# Patient Record
Sex: FEMALE | Race: BLACK OR AFRICAN AMERICAN | NOT HISPANIC OR LATINO | URBAN - METROPOLITAN AREA
[De-identification: names, ages, dates, MRNs, and addresses within clinical notes are randomized per-mention and may not be internally consistent; named-entity substitution may affect disease eponyms.]

---

## 2020-08-13 ENCOUNTER — INPATIENT (INPATIENT)
Facility: HOSPITAL | Age: 45
LOS: 19 days | Discharge: HOME | End: 2020-09-02
Attending: PSYCHIATRY & NEUROLOGY | Admitting: PSYCHIATRY & NEUROLOGY
Payer: MEDICAID

## 2020-08-13 VITALS
HEART RATE: 81 BPM | DIASTOLIC BLOOD PRESSURE: 74 MMHG | RESPIRATION RATE: 16 BRPM | TEMPERATURE: 98 F | SYSTOLIC BLOOD PRESSURE: 100 MMHG | OXYGEN SATURATION: 100 %

## 2020-08-13 DIAGNOSIS — F25.9 SCHIZOAFFECTIVE DISORDER, UNSPECIFIED: ICD-10-CM

## 2020-08-13 DIAGNOSIS — F29 UNSPECIFIED PSYCHOSIS NOT DUE TO A SUBSTANCE OR KNOWN PHYSIOLOGICAL CONDITION: ICD-10-CM

## 2020-08-13 LAB
ALBUMIN SERPL ELPH-MCNC: 4.5 G/DL — SIGNIFICANT CHANGE UP (ref 3.3–5)
ALP SERPL-CCNC: 58 U/L — SIGNIFICANT CHANGE UP (ref 40–120)
ALT FLD-CCNC: 35 U/L — HIGH (ref 4–33)
ANION GAP SERPL CALC-SCNC: 18 MMO/L — HIGH (ref 7–14)
ANISOCYTOSIS BLD QL: SLIGHT — SIGNIFICANT CHANGE UP
APAP SERPL-MCNC: < 15 UG/ML — LOW (ref 15–25)
AST SERPL-CCNC: 50 U/L — HIGH (ref 4–32)
BASOPHILS # BLD AUTO: 0.02 K/UL — SIGNIFICANT CHANGE UP (ref 0–0.2)
BASOPHILS NFR BLD AUTO: 0.5 % — SIGNIFICANT CHANGE UP (ref 0–2)
BASOPHILS NFR SPEC: 0 % — SIGNIFICANT CHANGE UP (ref 0–2)
BILIRUB SERPL-MCNC: < 0.2 MG/DL — LOW (ref 0.2–1.2)
BLASTS # FLD: 0 % — SIGNIFICANT CHANGE UP (ref 0–0)
BUN SERPL-MCNC: 12 MG/DL — SIGNIFICANT CHANGE UP (ref 7–23)
CALCIUM SERPL-MCNC: 9.6 MG/DL — SIGNIFICANT CHANGE UP (ref 8.4–10.5)
CHLORIDE SERPL-SCNC: 102 MMOL/L — SIGNIFICANT CHANGE UP (ref 98–107)
CO2 SERPL-SCNC: 20 MMOL/L — LOW (ref 22–31)
CREAT SERPL-MCNC: 0.79 MG/DL — SIGNIFICANT CHANGE UP (ref 0.5–1.3)
EOSINOPHIL # BLD AUTO: 0.1 K/UL — SIGNIFICANT CHANGE UP (ref 0–0.5)
EOSINOPHIL NFR BLD AUTO: 2.3 % — SIGNIFICANT CHANGE UP (ref 0–6)
EOSINOPHIL NFR FLD: 0.9 % — SIGNIFICANT CHANGE UP (ref 0–6)
ETHANOL BLD-MCNC: < 10 MG/DL — SIGNIFICANT CHANGE UP
GIANT PLATELETS BLD QL SMEAR: PRESENT — SIGNIFICANT CHANGE UP
GLUCOSE SERPL-MCNC: 77 MG/DL — SIGNIFICANT CHANGE UP (ref 70–99)
HCG SERPL-ACNC: < 5 MIU/ML — SIGNIFICANT CHANGE UP
HCT VFR BLD CALC: 45.3 % — HIGH (ref 34.5–45)
HGB BLD-MCNC: 14.5 G/DL — SIGNIFICANT CHANGE UP (ref 11.5–15.5)
IMM GRANULOCYTES NFR BLD AUTO: 0.2 % — SIGNIFICANT CHANGE UP (ref 0–1.5)
LYMPHOCYTES # BLD AUTO: 0.75 K/UL — LOW (ref 1–3.3)
LYMPHOCYTES # BLD AUTO: 17.5 % — SIGNIFICANT CHANGE UP (ref 13–44)
LYMPHOCYTES NFR SPEC AUTO: 9.2 % — LOW (ref 13–44)
MACROCYTES BLD QL: SLIGHT — SIGNIFICANT CHANGE UP
MCHC RBC-ENTMCNC: 32 % — SIGNIFICANT CHANGE UP (ref 32–36)
MCHC RBC-ENTMCNC: 32 PG — SIGNIFICANT CHANGE UP (ref 27–34)
MCV RBC AUTO: 100 FL — SIGNIFICANT CHANGE UP (ref 80–100)
METAMYELOCYTES # FLD: 0 % — SIGNIFICANT CHANGE UP (ref 0–1)
MONOCYTES # BLD AUTO: 0.75 K/UL — SIGNIFICANT CHANGE UP (ref 0–0.9)
MONOCYTES NFR BLD AUTO: 17.5 % — HIGH (ref 2–14)
MONOCYTES NFR BLD: 14.7 % — HIGH (ref 2–9)
MYELOCYTES NFR BLD: 0 % — SIGNIFICANT CHANGE UP (ref 0–0)
NEUTROPHIL AB SER-ACNC: 68.8 % — SIGNIFICANT CHANGE UP (ref 43–77)
NEUTROPHILS # BLD AUTO: 2.66 K/UL — SIGNIFICANT CHANGE UP (ref 1.8–7.4)
NEUTROPHILS NFR BLD AUTO: 62 % — SIGNIFICANT CHANGE UP (ref 43–77)
NEUTS BAND # BLD: 0 % — SIGNIFICANT CHANGE UP (ref 0–6)
NRBC # FLD: 0 K/UL — SIGNIFICANT CHANGE UP (ref 0–0)
OTHER - HEMATOLOGY %: 0 — SIGNIFICANT CHANGE UP
PLATELET # BLD AUTO: 218 K/UL — SIGNIFICANT CHANGE UP (ref 150–400)
PLATELET COUNT - ESTIMATE: NORMAL — SIGNIFICANT CHANGE UP
PMV BLD: 8.9 FL — SIGNIFICANT CHANGE UP (ref 7–13)
POIKILOCYTOSIS BLD QL AUTO: SLIGHT — SIGNIFICANT CHANGE UP
POLYCHROMASIA BLD QL SMEAR: SLIGHT — SIGNIFICANT CHANGE UP
POTASSIUM SERPL-MCNC: 4 MMOL/L — SIGNIFICANT CHANGE UP (ref 3.5–5.3)
POTASSIUM SERPL-SCNC: 4 MMOL/L — SIGNIFICANT CHANGE UP (ref 3.5–5.3)
PROMYELOCYTES # FLD: 0 % — SIGNIFICANT CHANGE UP (ref 0–0)
PROT SERPL-MCNC: 7.5 G/DL — SIGNIFICANT CHANGE UP (ref 6–8.3)
RBC # BLD: 4.53 M/UL — SIGNIFICANT CHANGE UP (ref 3.8–5.2)
RBC # FLD: 12.9 % — SIGNIFICANT CHANGE UP (ref 10.3–14.5)
SALICYLATES SERPL-MCNC: < 5 MG/DL — LOW (ref 15–30)
SARS-COV-2 RNA SPEC QL NAA+PROBE: SIGNIFICANT CHANGE UP
SMUDGE CELLS # BLD: PRESENT — SIGNIFICANT CHANGE UP
SODIUM SERPL-SCNC: 140 MMOL/L — SIGNIFICANT CHANGE UP (ref 135–145)
TSH SERPL-MCNC: 2.93 UIU/ML — SIGNIFICANT CHANGE UP (ref 0.27–4.2)
VARIANT LYMPHS # BLD: 6.4 % — SIGNIFICANT CHANGE UP
WBC # BLD: 4.29 K/UL — SIGNIFICANT CHANGE UP (ref 3.8–10.5)
WBC # FLD AUTO: 4.29 K/UL — SIGNIFICANT CHANGE UP (ref 3.8–10.5)

## 2020-08-13 PROCEDURE — 70450 CT HEAD/BRAIN W/O DYE: CPT | Mod: 26

## 2020-08-13 PROCEDURE — 99285 EMERGENCY DEPT VISIT HI MDM: CPT

## 2020-08-13 RX ORDER — DIPHENHYDRAMINE HCL 50 MG
50 CAPSULE ORAL ONCE
Refills: 0 | Status: COMPLETED | OUTPATIENT
Start: 2020-08-13 | End: 2020-08-13

## 2020-08-13 RX ORDER — HALOPERIDOL DECANOATE 100 MG/ML
5 INJECTION INTRAMUSCULAR ONCE
Refills: 0 | Status: COMPLETED | OUTPATIENT
Start: 2020-08-13 | End: 2020-08-13

## 2020-08-13 RX ORDER — HALOPERIDOL DECANOATE 100 MG/ML
5 INJECTION INTRAMUSCULAR EVERY 6 HOURS
Refills: 0 | Status: DISCONTINUED | OUTPATIENT
Start: 2020-08-13 | End: 2020-09-02

## 2020-08-13 RX ORDER — RISPERIDONE 4 MG/1
2 TABLET ORAL AT BEDTIME
Refills: 0 | Status: DISCONTINUED | OUTPATIENT
Start: 2020-08-13 | End: 2020-08-17

## 2020-08-13 RX ORDER — HALOPERIDOL DECANOATE 100 MG/ML
5 INJECTION INTRAMUSCULAR EVERY 6 HOURS
Refills: 0 | Status: DISCONTINUED | OUTPATIENT
Start: 2020-08-13 | End: 2020-08-19

## 2020-08-13 RX ADMIN — Medication 2 MILLIGRAM(S): at 04:10

## 2020-08-13 RX ADMIN — HALOPERIDOL DECANOATE 5 MILLIGRAM(S): 100 INJECTION INTRAMUSCULAR at 13:09

## 2020-08-13 RX ADMIN — Medication 50 MILLIGRAM(S): at 05:08

## 2020-08-13 RX ADMIN — HALOPERIDOL DECANOATE 5 MILLIGRAM(S): 100 INJECTION INTRAMUSCULAR at 04:10

## 2020-08-13 RX ADMIN — Medication 2 MILLIGRAM(S): at 13:10

## 2020-08-13 NOTE — ED ADULT TRIAGE NOTE - CHIEF COMPLAINT QUOTE
Pt arrives to ED via EMS from street brought in by [facility] and EMS.  Pt was attempting to enter a residence.  Pt states it was her childhood home and she was attempting to deliver eviction papers to people squatting in the residence.  Pt denies all medical or psychiatric history however [facility] reports pt has prior hospitalization related to having to be "tazed" by authorities.   When asked triage questions, pt states no to S/I, H/I, audio or visual hallucinations.  Pt makes statements including "I never had" and lists multiple medical conditions.  Pt denies drugs or ETOH.  Pt states "I am not erratic." Dr hSaikh to evaluate pt. Pt arrives to ED via EMS from street brought in by [facility] and EMS.  Pt was attempting to enter a senior residence.  911 called by  at facility. Pt states it was her childhood home and she was attempting to deliver eviction papers to people squatting in the residence.  Pt denies all medical or psychiatric history however [facility] reports pt has prior hospitalization related to having to be "tazed" by authorities.   When asked triage questions, pt states no to S/I, H/I, audio or visual hallucinations.  Pt makes statements including "I never had" and lists multiple medical conditions.  Pt denies drugs or ETOH.  Pt states "I am not erratic." Dr Shaikh to evaluate pt.

## 2020-08-13 NOTE — ED PROVIDER NOTE - CLINICAL SUMMARY MEDICAL DECISION MAKING FREE TEXT BOX
45 y/o F with no known medical or psychiatric illness BIB EMS and PD for disorganized and bizarre behavior.  Pt is highly disorganized and paranoid on evaluation, agitated and requiring medications and restraints briefly for safety.  Will obtain labs, ekg, tox screen, ct head to eval for organic etiology of behavior.  Exam is atraumatic, poss underlying intoxication, consider psych eval if medical eval is unrevealing.

## 2020-08-13 NOTE — ED ADULT NURSE REASSESSMENT NOTE - NS ED NURSE REASSESS COMMENT FT1
CThead completed, results and psych eval pending. Pt remains sedated, NAD, even unlabored resp observed.

## 2020-08-13 NOTE — ED ADULT NURSE REASSESSMENT NOTE - NS ED NURSE REASSESS COMMENT FT1
+ effect from IM meds observed, pt sedated, labs drawn, pending EKG and covid swab. 4pt restraint and 1:1 obs dc'd as ordered. Pending psych eval and MC. Will continue to monitor for safety.

## 2020-08-13 NOTE — ED BEHAVIORAL HEALTH ASSESSMENT NOTE - VIOLENCE RISK FACTORS:
Impulsivity/Affective dysregulation/Noncompliance with treatment/Lack of insight into violence risk/need for treatment

## 2020-08-13 NOTE — ED ADULT NURSE REASSESSMENT NOTE - NS ED NURSE REASSESS COMMENT FT1
Received report from night RN pt lying on bed in nad eyes close breathing even & unlabored safety & comfort measures maintained eval on going.

## 2020-08-13 NOTE — ED BEHAVIORAL HEALTH ASSESSMENT NOTE - SUICIDE RISK FACTORS
Mood Disorder current/past/Psychotic disorder current/past/Agitation/Severe Anxiety/Panic/Unable to engage in safety planning/Recent onset of current/past psychiatric diagnosis

## 2020-08-13 NOTE — ED ADULT NURSE REASSESSMENT NOTE - NS ED NURSE REASSESS COMMENT FT1
Pt remains agitated, uncooperative and paranoid in  intake area. pt refusing to change into hospital gowns and have belongings secured. Pt medicated as ordered at 0410 with minimal effect observed. Pt receievd STAT benadryl 50mg IM at 0425. Upon removal of handcuffs, patient placed in 4pt restraints at 0430. No injuries observed or reported. Pt cursing at staff and threatening at this time. 1:1 obs maintained for safety. Pending lab draw and psych eval when less agitated.

## 2020-08-13 NOTE — ED ADULT NURSE REASSESSMENT NOTE - NS ED NURSE REASSESS COMMENT FT1
pt lying on bed in nad eyes close breathing even & unlabored report giving to ZH low 4, EMS activated enroute annmarie on going.

## 2020-08-13 NOTE — ED BEHAVIORAL HEALTH NOTE - BEHAVIORAL HEALTH NOTE
Patient remains sedated and unable to participate in interview at this time likely secondary to medication effect as pt received Haldol 5mg/ Ativan 2mg/ Benadryl 50mg IM overnight.  Psychiatry will evaluate when able, call with questions and/or concerns.

## 2020-08-13 NOTE — ED PROVIDER NOTE - OBJECTIVE STATEMENT
45 y/o F with no known past medical or psychiatric hx BIB EMS and PD for disorganized and aggressive behavior.  911 was activated by security at a senior care home.  Pt was found attempting to break into the residence around 3am.  Pt states that this was her childhood home and people are squatting there and that she called PD to assist her in serving the squatters court papers.  Pt denies any issues.  (+)tob use but denies etoh or drug use.      Pt noted to be disorganized and highly irritable on exam, yelling and rambling.  Pt brought back to  immediately on arrival, presented in handcuffs and received IM medications and required 4 point restraints for safety given high level of agitation and disorganization.

## 2020-08-13 NOTE — ED BEHAVIORAL HEALTH ASSESSMENT NOTE - DESCRIPTION
After she came to the ED this morning, the Pt was medicated with Haldol 5mg IM + Ativan 2mg IM + Benadryl 50mg IM with good effect.  Pt slept afterwards.  There was no recurrence of agitation/aggressive behavior prior to the bedside interview that this writer conducted.    Vital Signs Last 24 Hrs  T(C): 36.7 (13 Aug 2020 12:17), Max: 36.7 (13 Aug 2020 12:17)  T(F): 98.1 (13 Aug 2020 12:17), Max: 98.1 (13 Aug 2020 12:17)  HR: 74 (13 Aug 2020 12:17) (74 - 81)  BP: 105/67 (13 Aug 2020 12:17) (100/74 - 105/67)  BP(mean): --  RR: 16 (13 Aug 2020 12:17) (16 - 16)  SpO2: 100% (13 Aug 2020 12:17) (100% - 100%)  LABS:                        14.5   4.29  )-----------( 218      ( 13 Aug 2020 05:00 )             45.3     13 Aug 2020 05:00    140    |  102    |  12     ----------------------------<  77     4.0     |  20     |  0.79     Ca    9.6        13 Aug 2020 05:00    TPro  7.5    /  Alb  4.5    /  TBili  < 0.2  /  DBili  x      /  AST  50     /  ALT  35     /  AlkPhos  58     13 Aug 2020 05:00  BAL < 10 NONE DOCUMENTED PER PSYCKES After she came to the ED this morning, the Pt was medicated with Haldol 5mg IM + Ativan 2mg IM + Benadryl 50mg IM with good effect.  Pt slept afterwards.  There was no recurrence of agitation/aggressive behavior prior to the bedside interview that this writer conducted.    Vital Signs Last 24 Hrs  T(C): 36.7 (13 Aug 2020 12:17), Max: 36.7 (13 Aug 2020 12:17)  T(F): 98.1 (13 Aug 2020 12:17), Max: 98.1 (13 Aug 2020 12:17)  HR: 74 (13 Aug 2020 12:17) (74 - 81)  BP: 105/67 (13 Aug 2020 12:17) (100/74 - 105/67)  BP(mean): --  RR: 16 (13 Aug 2020 12:17) (16 - 16)  SpO2: 100% (13 Aug 2020 12:17) (100% - 100%)  LABS:                     14.5   4.29  )-----------( 218      ( 13 Aug 2020 05:00 )             45.3     13 Aug 2020 05:00    140    |  102    |  12     ----------------------------<  77     4.0     |  20     |  0.79     Ca    9.6        13 Aug 2020 05:00    TPro  7.5    /  Alb  4.5    /  TBili  < 0.2  /  DBili  x      /  AST  50     /  ALT  35     /  AlkPhos  58     13 Aug 2020 05:00  BAL < 10  CT Head: no acute intracranial pathology noted unknown psychosocial hx as she is uncooperative, hostile

## 2020-08-13 NOTE — ED BEHAVIORAL HEALTH NOTE - BEHAVIORAL HEALTH NOTE
[employer] search is completed which reveals past behavioral health diagnoses of Bipolar I disorder and unspecified depressive disorder.  Prior treatment documented at [facility] from 3/6/2019 through 6/26/2019 for 2 billed visits for a diagnosis of unspecified mood disorder.  No documented history of inpatient psychiatric hospitalizations or ER visits found via [employer] search with Medicaid enrolled after 2018.

## 2020-08-13 NOTE — ED BEHAVIORAL HEALTH ASSESSMENT NOTE - OTHER PAST PSYCHIATRIC HISTORY (INCLUDE DETAILS REGARDING ONSET, COURSE OF ILLNESS, INPATIENT/OUTPATIENT TREATMENT)
hx of Schizoaffective disorder, no hx of in-Pt psych admissions for the past 5 yrs, no hx of SA     Pt used to follow up at the [facility] from 11/30/2017- 2/6/2018; her case was also closed at [facility] due to no show appts    Per Psyckes: 1 Psych ER visit at [facility] last 7/16/2017

## 2020-08-13 NOTE — ED ADULT NURSE REASSESSMENT NOTE - NS ED NURSE REASSESS COMMENT FT1
Pt awake, calm denies s/i h/i/avh  presently pt awaiting psych eval safety & comfort measures maintained eval on going.

## 2020-08-13 NOTE — ED BEHAVIORAL HEALTH ASSESSMENT NOTE - OTHER
UNKNOWN STATUS. Per chart, the Pt was found from the street and brought in by [employer] and EMS UNKNOWN [employer] STOP Reference #: [id number] - no controlled substances prescribed gaunt looking did not attempt to ambulate angry unable to assess as she is uncooperative ? HOMELESSNESS, ? LIMITED SOCIAL SUPPORT, ? FINANCIAL CONSTRAINTS

## 2020-08-13 NOTE — ED BEHAVIORAL HEALTH ASSESSMENT NOTE - DETAILS
on initial presentation at the triage, she denied harboring any SI/HI; on Psyckes, no documented SA [employer] reported that Pt had prior hospitalization related to having to be "tazed" by authorities left extensive VM to Dr ASHWIN Shah ([id number]) JOHAN Clarke

## 2020-08-13 NOTE — ED BEHAVIORAL HEALTH ASSESSMENT NOTE - RISK ASSESSMENT
RISK ASSESSMENT:   Modifiable risk factors: psychosis  Unmodifiable risk factors: aggressive behavior, Pt is doing poorly, hx of schizoaffective disorder and depressive disorder, previous CPEP visit at [facility], treatment noncompliance, co morbid substance abuse  Protective factors: no hx of SA, no access to guns    Currently, given all risks taken into consideration, the Pt is currently at low acute suicide risk but remains at chronically elevated risk of harming self.  The Pt is not be deemed dischargeable back to the community at this time.  This increased risk can be mitigated by a psychiatric admission with supervision, continuing psych meds with titration towards efficacious dosing range Low Acute Suicide Risk

## 2020-08-13 NOTE — ED BEHAVIORAL HEALTH NOTE - BEHAVIORAL HEALTH NOTE
Writer called [facility] [phone] states pt is a client at the [facility] .  writer Called the [facility].  Writer left a voicemail requesting a callback to social work [employer].  Voicemail indicates they will call back within 12 to 24 hours. Writer called [facility] [phone] states pt is a client at the [facility] .  writer Called the [facility].  Writer left a voicemail requesting a callback to social work [employer].  Voicemail indicates they will call back within 12 to 24 hours.    Writer received a call from .  Was informed pt was seen at the [facility] by psychiatrist Dr. Schuster 6/2019.  Pt had two no show appointments after that and case was closed out.  They do not have further information and do not know if pt was referred out else.  She states the mental health department doesn't open till 12pm  and can be called after 12.

## 2020-08-13 NOTE — ED BEHAVIORAL HEALTH NOTE - BEHAVIORAL HEALTH NOTE
Writer called [facility] and spoke to Sivan worthington states that she will  back to inform if there are female beds. Writer called [facility] and spoke to Sivan who states that she will  back to inform if there are female beds.

## 2020-08-13 NOTE — ED ADULT NURSE NOTE - CHIEF COMPLAINT QUOTE
Pt arrives to ED via EMS from street brought in by [facility] and EMS.  Pt was attempting to enter a senior residence.  911 called by  at facility. Pt states it was her childhood home and she was attempting to deliver eviction papers to people squatting in the residence.  Pt denies all medical or psychiatric history however [facility] reports pt has prior hospitalization related to having to be "tazed" by authorities.   When asked triage questions, pt states no to S/I, H/I, audio or visual hallucinations.  Pt makes statements including "I never had" and lists multiple medical conditions.  Pt denies drugs or ETOH.  Pt states "I am not erratic." Dr Shaikh to evaluate pt.

## 2020-08-13 NOTE — ED PROVIDER NOTE - PROGRESS NOTE DETAILS
Destruction Type: electrodesiccation Notification Instructions: Patient will be notified of biopsy results. However, patient instructed to call the office if not contacted within 2 weeks. Lab: [id number] Ne[address] PHAM: Pt is calm, sleeping comfortably.  4 point restraints removed at 4:40am Billing Type: Third-Party Bill Biopsy Type: H and E Detail Level: Detailed Render Post-Care Instructions In Note?: no Accession #: [id number] Lab Facility: [id number] Anesthesia Volume In Cc: 1 Wound Care: Petrolatum Bill As?: Malignant Destruction Anesthesia Type: 1% lidocaine with epinephrine Post-Care Instructions: I reviewed with the patient in detail post-care instructions. Patient is to keep the biopsy site dry overnight, and then apply plain petrolatum (Vaseline ointment) twice daily until healed. Patient may apply hydrogen peroxide soaks to remove any crusting. Consent: Written consent was obtained and risks were reviewed including but not limited to scarring, infection, bleeding, scabbing, incomplete removal, nerve damage and allergy to anesthesia. Size Of Lesion After Curettage: 1.3 Number Of Curettages: 3 ARTHUR Clarke NP: Patient endorsed from previous ED provider. Patient is agitated, pacing unit and unable to be redirected. Will provide medications to patient to ensure safety.  Plan from psychiatry is for patient to be admitted. ARTHUR Clarke, NP: Patient now calm, resting but arousable.  Labs reviewed, patient not in distress, nontoxic appearing and medically stable for inpatient psychiatric admission per the recommendation of ED psychiatry.

## 2020-08-13 NOTE — ED BEHAVIORAL HEALTH ASSESSMENT NOTE - INVOLUNTARY INTRAMUSCULAR MEDICATION DETAILS
Haldol 5mg IM + Ativan 2mg IM at 422AM with addition of Benadryl 50mg IM at 445AM; again, medicated with  Haldol 5mg IM + Ativan 2mg IM at 104PM

## 2020-08-13 NOTE — ED BEHAVIORAL HEALTH ASSESSMENT NOTE - PAST PSYCHOTROPIC MEDICATION
Zyprexa 10mg daily, Remeron 30mg daily, Risperdal 3mg daily, Nicotine Polacrilex 2mg 5.5 daily, Invega Sustenna 234mg IM, Lamictal 25mg BID
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
## 2020-08-13 NOTE — ED BEHAVIORAL HEALTH ASSESSMENT NOTE - SUMMARY
44/F hx of Schizoaffective disorder, no hx of in-Pt psych admissions for the past 5 yrs, no hx of SA nd hx of THC and tobacco related disorder.  Pt has hx of noncompliance to medications and Psych aftercare.  Early this AM, she presented to the ED BIB EMS and PD due to aggressive behavior.    At this time, the Pt is disorganized in TP (illogical, impaired reasoning, tangential), she is severely affectively dysregulated, delusional: paranoid, grandiose - "I own that place (in reference to the [facility]), remains unpredictable, has poor insight and impaired judgement. Pt is unable to partake towards safety planning.  Pt is a threat to self and to others given past hx of aggression; current mood lability and overall, her psychosis.  Said symptoms have caused functional impairment.  Pt cannot be safely discharged back to the community at this time.  She will benefit from in-Pt psych admission for stabilization of mood and psychosis as well as ensuring safety    RECOMMENDATIONS:   1. restart Risperdal 2mg HS.  Titrate as needed.  Suggest to transition to CORTES form given hx of poor compliance (Pt was previously on Invega sustenna 234mg IM)   2. PRN: Haldol 5mg PO/IM + Ativan 2mg PO/IM q6hrs PRN for psychotic agitation (PO)/ SEVERE PSYCHOTIC AGITATION (IM)  3. once medically cleared, facilitate transfer to [facility] on 9.39 status

## 2020-08-13 NOTE — ED BEHAVIORAL HEALTH NOTE - BEHAVIORAL HEALTH NOTE
COVID Exposure Screen- Patient    1.	*In the past 14 days, have you been around anyone with a positive COVID-19 test?*   (  ) Yes   (  ) No   ( X ) Unknown- Reason (e.g. patient uncertain, sedated, refusing to answer, etc.):  ______ PATIENT IS AGITATED, PARANOID; REFUSED TO ANSWER QUESTIONS PERTAINING TO COVID  IF YES PROCEED TO QUESTION #2. IF NO or UNKNOWN, PLEASE SKIP TO QUESTION #7  2.	Were you within 6 feet of them for at least 15 minutes? (  ) Yes   (  ) No   (  ) Unknown- Reason: ______    3.	Have you provided care for them? (  ) Yes   (  ) No   (  ) Unknown- Reason: ______    4.	Have you had direct physical contact with them (touched, hugged, or kissed them)? (  ) Yes   (  ) No    (  ) Unknown- Reason: ______    5.	Have you shared eating or drinking utensils with them? (  ) Yes   (  ) No    (  ) Unknown- Reason: ______    6.	Have they sneezed, coughed, or somehow got respiratory droplets on you? (  ) Yes   (  ) No    (  ) Unknown- Reason: ______    7.	*Have you been out of [place] within the past 14 days?*  (  ) Yes   (  ) No   ( X ) Unknown- Reason (e.g. patient uncertain, sedated, refusing to answer, etc.): _______ PATIENT IS AGITATED, PARANOID; REFUSED TO ANSWER QUESTIONS PERTAINING TO COVID  IF YES PLEASE ANSWER THE FOLLOWING QUESTIONS:  8.	Which state/country have you been to? ______   9.	Were you there over 24 hours? (  ) Yes   (  ) No    (  ) Unknown- Reason: ______    10.	What date did you return to [facility]? ______     COVID Exposure Screen- collateral (i.e. third-party, chart review, belongings, etc; include EMS and ED staff)    1.	*In the past 14 days, has the patient been around anyone with a positive COVID-19 test?*   (  ) Yes   (  ) No   (  ) Unknown- Reason (e.g. collateral uncertain, refusing to answer, etc.):  ______ SW OUTREACH WITH THE CLINIC - THEY DO NOT KNOW DETAILS   IF YES PROCEED TO QUESTION #2. IF NO or UNKNOWN, PLEASE SKIP TO QUESTION #7  2.	Was the patient within 6 feet of them for at least 15 minutes? (  ) Yes   (  ) No   (  ) Unknown- Reason: ______    3.	Did the patient provide care for them? (  ) Yes   (  ) No   (  ) Unknown- Reason: ______    4.	Did the patient have direct physical contact with them (touched, hugged, or kissed them)? (  ) Yes   (  ) No    (  ) Unknown- Reason: ______    5.	Did the patient share eating or drinking utensils with them? (  ) Yes   (  ) No    (  ) Unknown- Reason: ______    6.	Have they sneezed, coughed, or somehow got respiratory droplets on the patient? (  ) Yes   (  ) No    (  ) Unknown- Reason: ______    7.	*Has the patient been out of [place] within the past 14 days?*  (  ) Yes   (  ) No   (X ) Unknown- Reason (e.g. collateral uncertain, refusing to answer, etc.): _______ SW OUTREACH WITH THE CLINIC - THEY DO NOT KNOW DETAILS  IF YES PLEASE ANSWER THE FOLLOWING QUESTIONS:  8.	Which state/country has the patient been to? ______   9.	Was the patient there over 24 hours? (  ) Yes   (  ) No    (  ) Unknown- Reason: ______    10.	What date did the patient return to [facility]? ______

## 2020-08-14 LAB
CHOLEST SERPL-MCNC: 159 MG/DL — SIGNIFICANT CHANGE UP (ref 120–199)
HBA1C BLD-MCNC: 5.6 % — SIGNIFICANT CHANGE UP (ref 4–5.6)
HDLC SERPL-MCNC: 68 MG/DL — HIGH (ref 45–65)
LIPID PNL WITH DIRECT LDL SERPL: 85 MG/DL — SIGNIFICANT CHANGE UP
TRIGL SERPL-MCNC: 54 MG/DL — SIGNIFICANT CHANGE UP (ref 10–149)

## 2020-08-14 PROCEDURE — 99222 1ST HOSP IP/OBS MODERATE 55: CPT

## 2020-08-15 LAB
SARS-COV-2 IGG SERPL IA-ACNC: 0.3 RATIO — SIGNIFICANT CHANGE UP
SARS-COV-2 IGG SERPL QL IA: ABNORMAL
SARS-COV-2 IGG SERPL QL IA: NEGATIVE — SIGNIFICANT CHANGE UP
SARS-COV-2 IGM SERPL IA-ACNC: 0.85 RATIO — HIGH

## 2020-08-15 PROCEDURE — 99232 SBSQ HOSP IP/OBS MODERATE 35: CPT

## 2020-08-15 RX ORDER — CHLORPROMAZINE HCL 10 MG
50 TABLET ORAL ONCE
Refills: 0 | Status: DISCONTINUED | OUTPATIENT
Start: 2020-08-15 | End: 2020-09-02

## 2020-08-15 RX ORDER — ACETAMINOPHEN 500 MG
650 TABLET ORAL EVERY 6 HOURS
Refills: 0 | Status: DISCONTINUED | OUTPATIENT
Start: 2020-08-15 | End: 2020-09-02

## 2020-08-16 PROCEDURE — 99232 SBSQ HOSP IP/OBS MODERATE 35: CPT

## 2020-08-16 RX ADMIN — Medication 650 MILLIGRAM(S): at 12:59

## 2020-08-16 RX ADMIN — RISPERIDONE 2 MILLIGRAM(S): 4 TABLET ORAL at 20:27

## 2020-08-16 RX ADMIN — Medication 650 MILLIGRAM(S): at 11:20

## 2020-08-17 PROCEDURE — 99232 SBSQ HOSP IP/OBS MODERATE 35: CPT

## 2020-08-17 RX ORDER — RISPERIDONE 4 MG/1
3 TABLET ORAL AT BEDTIME
Refills: 0 | Status: DISCONTINUED | OUTPATIENT
Start: 2020-08-17 | End: 2020-08-27

## 2020-08-18 PROCEDURE — 99232 SBSQ HOSP IP/OBS MODERATE 35: CPT

## 2020-08-18 RX ORDER — HALOPERIDOL DECANOATE 100 MG/ML
5 INJECTION INTRAMUSCULAR ONCE
Refills: 0 | Status: DISCONTINUED | OUTPATIENT
Start: 2020-08-18 | End: 2020-08-19

## 2020-08-18 RX ORDER — DIPHENHYDRAMINE HCL 50 MG
50 CAPSULE ORAL ONCE
Refills: 0 | Status: DISCONTINUED | OUTPATIENT
Start: 2020-08-18 | End: 2020-09-02

## 2020-08-18 RX ORDER — HALOPERIDOL DECANOATE 100 MG/ML
5 INJECTION INTRAMUSCULAR ONCE
Refills: 0 | Status: COMPLETED | OUTPATIENT
Start: 2020-08-18 | End: 2020-08-18

## 2020-08-18 RX ORDER — DIPHENHYDRAMINE HCL 50 MG
50 CAPSULE ORAL ONCE
Refills: 0 | Status: COMPLETED | OUTPATIENT
Start: 2020-08-18 | End: 2020-08-18

## 2020-08-18 RX ADMIN — HALOPERIDOL DECANOATE 5 MILLIGRAM(S): 100 INJECTION INTRAMUSCULAR at 10:34

## 2020-08-18 RX ADMIN — Medication 50 MILLIGRAM(S): at 10:34

## 2020-08-18 RX ADMIN — Medication 2 MILLIGRAM(S): at 10:34

## 2020-08-19 PROCEDURE — 99232 SBSQ HOSP IP/OBS MODERATE 35: CPT

## 2020-08-19 RX ORDER — OLANZAPINE 15 MG/1
5 TABLET, FILM COATED ORAL ONCE
Refills: 0 | Status: DISCONTINUED | OUTPATIENT
Start: 2020-08-19 | End: 2020-08-22

## 2020-08-19 RX ORDER — DIPHENHYDRAMINE HCL 50 MG
50 CAPSULE ORAL ONCE
Refills: 0 | Status: DISCONTINUED | OUTPATIENT
Start: 2020-08-19 | End: 2020-09-02

## 2020-08-20 PROCEDURE — 99232 SBSQ HOSP IP/OBS MODERATE 35: CPT

## 2020-08-20 RX ADMIN — Medication 650 MILLIGRAM(S): at 22:26

## 2020-08-20 RX ADMIN — Medication 650 MILLIGRAM(S): at 20:40

## 2020-08-21 PROCEDURE — 99232 SBSQ HOSP IP/OBS MODERATE 35: CPT

## 2020-08-21 RX ADMIN — Medication 2 MILLIGRAM(S): at 17:31

## 2020-08-21 RX ADMIN — HALOPERIDOL DECANOATE 5 MILLIGRAM(S): 100 INJECTION INTRAMUSCULAR at 17:31

## 2020-08-22 PROCEDURE — 99232 SBSQ HOSP IP/OBS MODERATE 35: CPT

## 2020-08-22 RX ORDER — OLANZAPINE 15 MG/1
5 TABLET, FILM COATED ORAL ONCE
Refills: 0 | Status: COMPLETED | OUTPATIENT
Start: 2020-08-22 | End: 2020-08-22

## 2020-08-22 RX ORDER — OLANZAPINE 15 MG/1
5 TABLET, FILM COATED ORAL ONCE
Refills: 0 | Status: DISCONTINUED | OUTPATIENT
Start: 2020-08-22 | End: 2020-09-02

## 2020-08-22 RX ORDER — OLANZAPINE 15 MG/1
5 TABLET, FILM COATED ORAL ONCE
Refills: 0 | Status: DISCONTINUED | OUTPATIENT
Start: 2020-08-22 | End: 2020-08-22

## 2020-08-22 RX ADMIN — OLANZAPINE 5 MILLIGRAM(S): 15 TABLET, FILM COATED ORAL at 18:30

## 2020-08-22 RX ADMIN — HALOPERIDOL DECANOATE 5 MILLIGRAM(S): 100 INJECTION INTRAMUSCULAR at 08:33

## 2020-08-22 RX ADMIN — Medication 2 MILLIGRAM(S): at 08:33

## 2020-08-22 RX ADMIN — Medication 2 MILLIGRAM(S): at 16:58

## 2020-08-22 RX ADMIN — HALOPERIDOL DECANOATE 5 MILLIGRAM(S): 100 INJECTION INTRAMUSCULAR at 16:58

## 2020-08-23 PROCEDURE — 99232 SBSQ HOSP IP/OBS MODERATE 35: CPT

## 2020-08-23 RX ORDER — HALOPERIDOL DECANOATE 100 MG/ML
5 INJECTION INTRAMUSCULAR ONCE
Refills: 0 | Status: DISCONTINUED | OUTPATIENT
Start: 2020-08-23 | End: 2020-08-23

## 2020-08-23 RX ORDER — OLANZAPINE 15 MG/1
5 TABLET, FILM COATED ORAL ONCE
Refills: 0 | Status: COMPLETED | OUTPATIENT
Start: 2020-08-23 | End: 2020-08-23

## 2020-08-23 RX ORDER — CHLORPROMAZINE HCL 10 MG
50 TABLET ORAL ONCE
Refills: 0 | Status: COMPLETED | OUTPATIENT
Start: 2020-08-23 | End: 2020-08-23

## 2020-08-23 RX ORDER — DIPHENHYDRAMINE HCL 50 MG
50 CAPSULE ORAL ONCE
Refills: 0 | Status: DISCONTINUED | OUTPATIENT
Start: 2020-08-23 | End: 2020-08-23

## 2020-08-23 RX ADMIN — Medication 50 MILLIGRAM(S): at 11:45

## 2020-08-23 RX ADMIN — HALOPERIDOL DECANOATE 5 MILLIGRAM(S): 100 INJECTION INTRAMUSCULAR at 08:40

## 2020-08-23 RX ADMIN — OLANZAPINE 5 MILLIGRAM(S): 15 TABLET, FILM COATED ORAL at 09:15

## 2020-08-23 RX ADMIN — Medication 2 MILLIGRAM(S): at 08:40

## 2020-08-23 RX ADMIN — Medication 2 MILLIGRAM(S): at 11:45

## 2020-08-24 PROCEDURE — 99232 SBSQ HOSP IP/OBS MODERATE 35: CPT

## 2020-08-24 RX ORDER — DIPHENHYDRAMINE HCL 50 MG
50 CAPSULE ORAL ONCE
Refills: 0 | Status: COMPLETED | OUTPATIENT
Start: 2020-08-24 | End: 2020-08-24

## 2020-08-24 RX ORDER — CHLORPROMAZINE HCL 10 MG
50 TABLET ORAL ONCE
Refills: 0 | Status: COMPLETED | OUTPATIENT
Start: 2020-08-24 | End: 2020-08-24

## 2020-08-24 RX ADMIN — Medication 50 MILLIGRAM(S): at 12:55

## 2020-08-25 PROCEDURE — 99232 SBSQ HOSP IP/OBS MODERATE 35: CPT

## 2020-08-25 RX ADMIN — Medication 650 MILLIGRAM(S): at 14:09

## 2020-08-25 RX ADMIN — RISPERIDONE 3 MILLIGRAM(S): 4 TABLET ORAL at 20:51

## 2020-08-26 PROCEDURE — 99232 SBSQ HOSP IP/OBS MODERATE 35: CPT

## 2020-08-26 RX ADMIN — RISPERIDONE 3 MILLIGRAM(S): 4 TABLET ORAL at 20:45

## 2020-08-26 RX ADMIN — Medication 650 MILLIGRAM(S): at 18:45

## 2020-08-27 PROCEDURE — 99232 SBSQ HOSP IP/OBS MODERATE 35: CPT

## 2020-08-27 RX ORDER — DIVALPROEX SODIUM 500 MG/1
500 TABLET, DELAYED RELEASE ORAL AT BEDTIME
Refills: 0 | Status: DISCONTINUED | OUTPATIENT
Start: 2020-08-27 | End: 2020-09-02

## 2020-08-27 RX ORDER — RISPERIDONE 4 MG/1
4 TABLET ORAL AT BEDTIME
Refills: 0 | Status: DISCONTINUED | OUTPATIENT
Start: 2020-08-27 | End: 2020-09-02

## 2020-08-27 RX ADMIN — HALOPERIDOL DECANOATE 5 MILLIGRAM(S): 100 INJECTION INTRAMUSCULAR at 20:56

## 2020-08-27 RX ADMIN — RISPERIDONE 4 MILLIGRAM(S): 4 TABLET ORAL at 20:07

## 2020-08-27 RX ADMIN — Medication 2 MILLIGRAM(S): at 20:57

## 2020-08-28 LAB — HIV 1+2 AB+HIV1 P24 AG SERPL QL IA: SIGNIFICANT CHANGE UP

## 2020-08-28 PROCEDURE — 99232 SBSQ HOSP IP/OBS MODERATE 35: CPT

## 2020-08-28 RX ADMIN — RISPERIDONE 4 MILLIGRAM(S): 4 TABLET ORAL at 20:45

## 2020-08-29 RX ADMIN — RISPERIDONE 4 MILLIGRAM(S): 4 TABLET ORAL at 20:00

## 2020-08-30 RX ADMIN — RISPERIDONE 4 MILLIGRAM(S): 4 TABLET ORAL at 20:00

## 2020-08-31 PROCEDURE — 99231 SBSQ HOSP IP/OBS SF/LOW 25: CPT

## 2020-08-31 RX ADMIN — RISPERIDONE 4 MILLIGRAM(S): 4 TABLET ORAL at 20:10

## 2020-09-01 RX ORDER — DIPHENHYDRAMINE HCL 50 MG
50 CAPSULE ORAL ONCE
Refills: 0 | Status: DISCONTINUED | OUTPATIENT
Start: 2020-09-01 | End: 2020-09-02

## 2020-09-01 RX ORDER — RISPERIDONE 4 MG/1
1 TABLET ORAL
Qty: 30 | Refills: 0
Start: 2020-09-01 | End: 2020-09-30

## 2020-09-01 RX ORDER — CHLORPROMAZINE HCL 10 MG
50 TABLET ORAL ONCE
Refills: 0 | Status: DISCONTINUED | OUTPATIENT
Start: 2020-09-01 | End: 2020-09-02

## 2020-09-01 RX ADMIN — Medication 650 MILLIGRAM(S): at 09:54

## 2020-09-01 RX ADMIN — RISPERIDONE 4 MILLIGRAM(S): 4 TABLET ORAL at 21:37

## 2020-09-01 RX ADMIN — HALOPERIDOL DECANOATE 5 MILLIGRAM(S): 100 INJECTION INTRAMUSCULAR at 12:55

## 2020-09-01 RX ADMIN — Medication 2 MILLIGRAM(S): at 12:55

## 2020-09-02 VITALS — TEMPERATURE: 98 F | DIASTOLIC BLOOD PRESSURE: 64 MMHG | SYSTOLIC BLOOD PRESSURE: 132 MMHG | HEART RATE: 94 BPM

## 2020-09-02 PROCEDURE — 99232 SBSQ HOSP IP/OBS MODERATE 35: CPT

## 2020-10-24 ENCOUNTER — EMERGENCY (EMERGENCY)
Facility: HOSPITAL | Age: 45
LOS: 0 days | Discharge: ANOTHER HOSPITAL | End: 2020-10-25
Attending: STUDENT IN AN ORGANIZED HEALTH CARE EDUCATION/TRAINING PROGRAM
Payer: MEDICAID

## 2020-10-24 VITALS
HEART RATE: 93 BPM | TEMPERATURE: 98 F | SYSTOLIC BLOOD PRESSURE: 118 MMHG | OXYGEN SATURATION: 100 % | RESPIRATION RATE: 20 BRPM | WEIGHT: 119.93 LBS | DIASTOLIC BLOOD PRESSURE: 64 MMHG | HEIGHT: 68 IN

## 2020-10-24 DIAGNOSIS — Z91.018 ALLERGY TO OTHER FOODS: ICD-10-CM

## 2020-10-24 DIAGNOSIS — F25.9 SCHIZOAFFECTIVE DISORDER, UNSPECIFIED: ICD-10-CM

## 2020-10-24 DIAGNOSIS — Z91.013 ALLERGY TO SEAFOOD: ICD-10-CM

## 2020-10-24 DIAGNOSIS — F91.9 CONDUCT DISORDER, UNSPECIFIED: ICD-10-CM

## 2020-10-24 DIAGNOSIS — Z88.8 ALLERGY STATUS TO OTHER DRUGS, MEDICAMENTS AND BIOLOGICAL SUBSTANCES: ICD-10-CM

## 2020-10-24 LAB
ANION GAP SERPL CALC-SCNC: 2 MMOL/L — LOW (ref 5–17)
APAP SERPL-MCNC: <2 UG/ML — LOW (ref 10–30)
APPEARANCE UR: CLEAR — SIGNIFICANT CHANGE UP
BASOPHILS # BLD AUTO: 0.04 K/UL — SIGNIFICANT CHANGE UP (ref 0–0.2)
BASOPHILS NFR BLD AUTO: 0.7 % — SIGNIFICANT CHANGE UP (ref 0–2)
BILIRUB UR-MCNC: NEGATIVE — SIGNIFICANT CHANGE UP
BUN SERPL-MCNC: 18 MG/DL — SIGNIFICANT CHANGE UP (ref 7–23)
CALCIUM SERPL-MCNC: 8.8 MG/DL — SIGNIFICANT CHANGE UP (ref 8.5–10.1)
CHLORIDE SERPL-SCNC: 111 MMOL/L — HIGH (ref 96–108)
CO2 SERPL-SCNC: 28 MMOL/L — SIGNIFICANT CHANGE UP (ref 22–31)
COLOR SPEC: YELLOW — SIGNIFICANT CHANGE UP
CREAT SERPL-MCNC: 0.97 MG/DL — SIGNIFICANT CHANGE UP (ref 0.5–1.3)
DIFF PNL FLD: NEGATIVE — SIGNIFICANT CHANGE UP
EOSINOPHIL # BLD AUTO: 0.28 K/UL — SIGNIFICANT CHANGE UP (ref 0–0.5)
EOSINOPHIL NFR BLD AUTO: 4.7 % — SIGNIFICANT CHANGE UP (ref 0–6)
ETHANOL SERPL-MCNC: <10 MG/DL — SIGNIFICANT CHANGE UP (ref 0–10)
GLUCOSE SERPL-MCNC: 99 MG/DL — SIGNIFICANT CHANGE UP (ref 70–99)
GLUCOSE UR QL: NEGATIVE MG/DL — SIGNIFICANT CHANGE UP
HCT VFR BLD CALC: 40 % — SIGNIFICANT CHANGE UP (ref 34.5–45)
HGB BLD-MCNC: 13.5 G/DL — SIGNIFICANT CHANGE UP (ref 11.5–15.5)
IMM GRANULOCYTES NFR BLD AUTO: 0.3 % — SIGNIFICANT CHANGE UP (ref 0–1.5)
KETONES UR-MCNC: NEGATIVE — SIGNIFICANT CHANGE UP
LEUKOCYTE ESTERASE UR-ACNC: NEGATIVE — SIGNIFICANT CHANGE UP
LYMPHOCYTES # BLD AUTO: 1.82 K/UL — SIGNIFICANT CHANGE UP (ref 1–3.3)
LYMPHOCYTES # BLD AUTO: 30.8 % — SIGNIFICANT CHANGE UP (ref 13–44)
MCHC RBC-ENTMCNC: 32.2 PG — SIGNIFICANT CHANGE UP (ref 27–34)
MCHC RBC-ENTMCNC: 33.8 GM/DL — SIGNIFICANT CHANGE UP (ref 32–36)
MCV RBC AUTO: 95.5 FL — SIGNIFICANT CHANGE UP (ref 80–100)
MONOCYTES # BLD AUTO: 0.75 K/UL — SIGNIFICANT CHANGE UP (ref 0–0.9)
MONOCYTES NFR BLD AUTO: 12.7 % — SIGNIFICANT CHANGE UP (ref 2–14)
NEUTROPHILS # BLD AUTO: 3 K/UL — SIGNIFICANT CHANGE UP (ref 1.8–7.4)
NEUTROPHILS NFR BLD AUTO: 50.8 % — SIGNIFICANT CHANGE UP (ref 43–77)
NITRITE UR-MCNC: NEGATIVE — SIGNIFICANT CHANGE UP
NRBC # BLD: 0 /100 WBCS — SIGNIFICANT CHANGE UP (ref 0–0)
PH UR: 7 — SIGNIFICANT CHANGE UP (ref 5–8)
PLATELET # BLD AUTO: 249 K/UL — SIGNIFICANT CHANGE UP (ref 150–400)
POTASSIUM SERPL-MCNC: 5.2 MMOL/L — SIGNIFICANT CHANGE UP (ref 3.5–5.3)
POTASSIUM SERPL-SCNC: 5.2 MMOL/L — SIGNIFICANT CHANGE UP (ref 3.5–5.3)
PROT UR-MCNC: NEGATIVE MG/DL — SIGNIFICANT CHANGE UP
RBC # BLD: 4.19 M/UL — SIGNIFICANT CHANGE UP (ref 3.8–5.2)
RBC # FLD: 13.2 % — SIGNIFICANT CHANGE UP (ref 10.3–14.5)
SALICYLATES SERPL-MCNC: 2.4 MG/DL — LOW (ref 2.8–20)
SODIUM SERPL-SCNC: 141 MMOL/L — SIGNIFICANT CHANGE UP (ref 135–145)
SP GR SPEC: 1.01 — SIGNIFICANT CHANGE UP (ref 1.01–1.02)
UROBILINOGEN FLD QL: NEGATIVE MG/DL — SIGNIFICANT CHANGE UP
WBC # BLD: 5.91 K/UL — SIGNIFICANT CHANGE UP (ref 3.8–10.5)
WBC # FLD AUTO: 5.91 K/UL — SIGNIFICANT CHANGE UP (ref 3.8–10.5)

## 2020-10-24 PROCEDURE — 99285 EMERGENCY DEPT VISIT HI MDM: CPT

## 2020-10-24 PROCEDURE — 90792 PSYCH DIAG EVAL W/MED SRVCS: CPT | Mod: 95

## 2020-10-24 PROCEDURE — 93010 ELECTROCARDIOGRAM REPORT: CPT

## 2020-10-24 RX ORDER — HALOPERIDOL DECANOATE 100 MG/ML
5 INJECTION INTRAMUSCULAR ONCE
Refills: 0 | Status: COMPLETED | OUTPATIENT
Start: 2020-10-24 | End: 2020-10-24

## 2020-10-24 RX ORDER — DIPHENHYDRAMINE HCL 50 MG
50 CAPSULE ORAL ONCE
Refills: 0 | Status: COMPLETED | OUTPATIENT
Start: 2020-10-24 | End: 2020-10-24

## 2020-10-24 RX ORDER — KETAMINE HYDROCHLORIDE 100 MG/ML
25 INJECTION INTRAMUSCULAR; INTRAVENOUS ONCE
Refills: 0 | Status: DISCONTINUED | OUTPATIENT
Start: 2020-10-24 | End: 2020-10-24

## 2020-10-24 RX ADMIN — HALOPERIDOL DECANOATE 5 MILLIGRAM(S): 100 INJECTION INTRAMUSCULAR at 03:50

## 2020-10-24 RX ADMIN — Medication 50 MILLIGRAM(S): at 03:50

## 2020-10-24 RX ADMIN — HALOPERIDOL DECANOATE 5 MILLIGRAM(S): 100 INJECTION INTRAMUSCULAR at 05:00

## 2020-10-24 RX ADMIN — Medication 2 MILLIGRAM(S): at 22:35

## 2020-10-24 RX ADMIN — Medication 2 MILLIGRAM(S): at 05:00

## 2020-10-24 RX ADMIN — HALOPERIDOL DECANOATE 5 MILLIGRAM(S): 100 INJECTION INTRAMUSCULAR at 22:35

## 2020-10-24 RX ADMIN — KETAMINE HYDROCHLORIDE 25 MILLIGRAM(S): 100 INJECTION INTRAMUSCULAR; INTRAVENOUS at 23:41

## 2020-10-24 NOTE — ED PROVIDER NOTE - OBJECTIVE STATEMENT
44f pmhx schizo-affective disorder. brought in by [facility] after verbal altercation with [facility] police. patient states that she is suing the [facility] because there are people using her identity to but metrocards. As a result the [facility] is targeting her and giving her multiple tickets as a consequence. denies etoh or substance abuse. denies SI/HI or AVH.

## 2020-10-24 NOTE — ED BEHAVIORAL HEALTH ASSESSMENT NOTE - HPI (INCLUDE ILLNESS QUALITY, SEVERITY, DURATION, TIMING, CONTEXT, MODIFYING FACTORS, ASSOCIATED SIGNS AND SYMPTOMS)
43yo with unclear social hx, with reported hx of schizoaffective d/o, cannabis use with no reported/documented hx of SA/SIB, with hx of psych hospitalizations (most recent in [facility] from 8/13 to 9/2) who was BIB[place] after confrontation with [employer] for disorganized beh/speech. Pt was requested to be seen by psych for reported odd/paranoid statements and agitation.      Pt was attempted to be seen and evaluated. She was noted to be sedated - woke up and responded briefly to her name but unable to keep awake.

## 2020-10-24 NOTE — ED PROVIDER NOTE - CLINICAL SUMMARY MEDICAL DECISION MAKING FREE TEXT BOX
patient agitated in triage. refusing to allow her vitals to be taken. patient likely a psychjiatrically unstable patient with limited insight into her condition. % haldol and 50 bendryl adminsitered to allow for patient evaluation and for staff and patient safety. patient continues to display errativ behavior, additional PRNs adminsitered. will evaluate for causes of delirium, likely psychiatric destabilization. will consult psych if no apparent organic cause of erratic behavior

## 2020-10-24 NOTE — ED BEHAVIORAL HEALTH ASSESSMENT NOTE - PSYCHIATRIC ISSUES AND PLAN (INCLUDE STANDING AND PRN MEDICATION)
risperdal 1mg po bid; prn: haldol 5mg po/IM q6hr prn severe agitation - hold if qtc>500; ativan 2mg po/IM q6hr prn severe agitation

## 2020-10-24 NOTE — ED ADULT TRIAGE NOTE - CADM TRG TX PRIOR TO ARRIVAL
HPI:  69 year old male with past medical history of Diastolic Heart Failure, Oxygen-Dependant COPD on 4L Home Oxygen, HTN, HLD, CAD s/p stent in LCx, Spinal Stenosis, Obesity, KENNY, and PAD sent in from [facility] for continued drainage from surgical site. Patient was recently admitted for fall now s/p right Hip Hemiarthroplasty on 02/05/18, discharged to [facility] on Lovenox for DVT Prophyalaxis for 4 weeks. Previous hospital course notable for Post-operative Ileus, NSVT while off BB, and persistent drainage from surgical site. Drainage was noticed by Nursing staff at the facility who contacted the orthopedist and was referred to [facility] for further evaluation. No Fever, chills, or night sweats. (26 Feb 2018 21:27)      Patient is a 69y old  Male who presents with a chief complaint of Sent from [facility] for continued drainage from surgical site (26 Feb 2018 21:27)      Consulted by Dr. Tang  for VTE prophylaxis, risk stratification, and anticoagulation management.    PAST MEDICAL & SURGICAL HISTORY:  PVD (peripheral vascular disease)  KENNY (obstructive sleep apnea)  Hyperlipidemia  Hypertension, unspecified type  Spinal stenosis  CHF (congestive heart failure)  Emphysema  COPD (chronic obstructive pulmonary disease)  No significant past surgical history    CrCl: 210.5  Caprini VTE Risk Score: #6  IMPROVE Bleeding Risk Score #2.5    Falls Risk:   High ( x )  Mod (  )  Low (  )    3/2/18: Patient seen at bedside- OOB to chair- dressing maintained dry. PICC line in place. Discussed heparin SQ with patient. Verbalized understanding and agreement.  3-6-18 pt seen at bedside oob in chair states he is going to rehab today at [facility].  discussed his anticoagulation with heparin no concerns.   FAMILY HISTORY:  No pertinent family history in first degree relatives    Denies any personal or familial history of clotting or bleeding disorders.    Allergies    No Known Allergies    Intolerances        REVIEW OF SYSTEMS    (  )Fever	     (  )Constipation	(  )SOB				(  )Headache	(  )Dysuria  (  )Chills	     (  )Melena	(  )Dyspnea present on exertion	                    (  )Dizziness                    (  )Polyuria  (  )Nausea	     (  )Hematochezia	(  )Cough			                    (  )Syncope   	(  )Hematuria  (  )Vomiting    (  )Chest Pain	(  )Wheezing			(  )Weakness  (  )Diarrhea     (  )Palpitations	(  )Anorexia			(  )Myalgia    All other review of systems negative: Yes    PHYSICAL EXAM:    Constitutional: Appears Well    Neurological: A& O x 3    Skin: Warm    Respiratory and Thorax: normal effort; Breath sounds: diminished throughout, O2 @L NC in place  	  Cardiovascular: S1, S2, regular, NMBR	    Gastrointestinal: BS + x 4Q, nontender	    Genitourinary:  Bladder nondistended, nontender    Musculoskeletal:   General Right:   + muscle/joint tenderness,   normal tone, no joint swelling,   ROM: limited	    General Left:   no muscle/joint tenderness,   normal tone, no joint swelling,   ROM: limited/full    Hip:  Right: Dressing CDI              Lower extrems:   Right: no calf tenderness              negative rolando's sign               + pedal pulses    Left:   no calf tenderness              negative rolando's sign               + pedal pulses                          10.6   13.6  )-----------( 364      ( 06 Mar 2018 06:05 )             33.4       03-06    139  |  96  |  23  ----------------------------<  113<H>  4.0   |  39<H>  |  0.72    Ca    9.6      06 Mar 2018 06:05                                10.7   15.7  )-----------( 379      ( 04 Mar 2018 08:43 )             33.3       03-04    137  |  95<L>  |  21  ----------------------------<  205<H>  3.8   |  36<H>  |  0.76    Ca    10.3<H>      04 Mar 2018 06:02              03-03    139  |  99  |  20  ----------------------------<  222<H>  4.0   |  33<H>  |  0.79    Ca    10.1      03 Mar 2018 05:39                                 8.9    15.4  )-----------( 316      ( 02 Mar 2018 05:37 )             26.3       03-02    138  |  100  |  21  ----------------------------<  247<H>  4.1   |  35<H>  |  0.76    Ca    8.9      02 Mar 2018 05:37                             9.3    12.1  )-----------( 296      ( 01 Mar 2018 06:27 )             29.3       03-01    139  |  100  |  18  ----------------------------<  177<H>  4.2   |  34<H>  |  0.66    Ca    8.6      01 Mar 2018 06:27        PT/INR - ( 28 Feb 2018 04:25 )   PT: 12.0 sec;   INR: 1.11 ratio         MEDICATIONS  (STANDING):  ALBUTerol    0.083% 2.5 milliGRAM(s) Nebulizer every 6 hours  amLODIPine   Tablet 5 milliGRAM(s) Oral daily  atorvastatin 20 milliGRAM(s) Oral at bedtime  benzonatate 100 milliGRAM(s) Oral daily  buDESOnide 160 MICROgram(s)/formoterol 4.5 MICROgram(s) Inhaler 2 Puff(s) Inhalation two times a day  cefTRIAXone Injectable. 2000 milliGRAM(s) IV Push every 24 hours  docusate sodium 100 milliGRAM(s) Oral three times a day  finasteride 5 milliGRAM(s) Oral daily  furosemide    Tablet 20 milliGRAM(s) Oral daily  guaiFENesin  milliGRAM(s) Oral every 12 hours  heparin  Injectable 5000 Unit(s) SubCutaneous every 8 hours  isosorbide   mononitrate ER Tablet (IMDUR) 60 milliGRAM(s) Oral daily  lactated ringers. 1000 milliLiter(s) IV Continuous <Continuous>  melatonin 3 milliGRAM(s) Oral at bedtime  metoprolol succinate ER 25 milliGRAM(s) Oral daily  polyethylene glycol 3350 17 Gram(s) Oral daily  predniSONE   Tablet 20 milliGRAM(s) Oral daily  tiotropium 18 MICROgram(s) Capsule 1 Capsule(s) Inhalation daily  vancomycin  IVPB 1000 milliGRAM(s) IV Intermittent every 12 hours    Vital Signs Last 24 Hrs  T(C): 36.8 (03-06-18 @ 04:34), Max: 37.1 (03-05-18 @ 17:16)  T(F): 98.2 (03-06-18 @ 04:34), Max: 98.7 (03-05-18 @ 17:16)  HR: 80 (03-06-18 @ 04:34) (80 - 99)  BP: 151/75 (03-06-18 @ 04:34) (127/62 - 166/82)  BP(mean): --  RR: 18 (03-05-18 @ 17:16) (18 - 18)  SpO2: 98% (03-06-18 @ 04:34) (97% - 98%)      DVT Prophylaxis:  LMWH                   (  )  Heparin SQ           ( x )  Coumadin             (  )  Xarelto                  (  )  Eliquis                   (  )  Venodynes           ( x )  Ambulation          (x  )  UFH                       (  )  Contraindicated  (  ) none

## 2020-10-24 NOTE — ED ADULT NURSE REASSESSMENT NOTE - NS ED NURSE REASSESS COMMENT FT1
received report from MADHAVI Retana. pt on the bed asleep. not on distress. safety measures checked. maintain on 1:1 observation.

## 2020-10-24 NOTE — ED ADULT NURSE NOTE - ED STAT RN HANDOFF DETAILS
Report given to Nurse Jade at the bedside where pt remains stable and arousable Report given to Nurse Jade at the bedside where pt remains in NAD, arousable, in constant 1:1 observation

## 2020-10-24 NOTE — ED BEHAVIORAL HEALTH ASSESSMENT NOTE - SUMMARY
43yo F with unclear social hx (domicile, employment, marital status) with past documented hx of schzioaffective d/o, cannabis use w/ most recent psych hospitalization in Aug 2020 in [facility]. Patient presented with reported disorganized beh, persecutory and paranoid statements. Patient had also been noted to be aggressive and agitated requiring multiple prns inc haldol 10mg total, ativan 2mg and benadryl 50mg. Pt is currently sedated and unable to engage in interview likely 2/2 to medication. Will be re-assessed
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
## 2020-10-24 NOTE — ED ADULT NURSE NOTE - ED STAT RN HANDOFF DETAILS 2
Assuming patient's care for coverage. Report received from RN and patient informed during rounding. Assessment available on KBC. Will continue to monitor. pt eating food at bedside. 1:1 constant observation continues Assuming patient's care for coverage. Report received from RN and patient informed during rounding. Assessment available on KBC. Will continue to monitor. pt is refusing vitals or EKG at this time. Dr. Bañuelos made aware. 1:1 constant observation continues

## 2020-10-24 NOTE — ED BEHAVIORAL HEALTH NOTE - BEHAVIORAL HEALTH NOTE
COVID Exposure Screen- collateral (i.e. third-party, chart review, belongings, etc; include EMS and ED staff)    1.	*In the past 14 days, has the patient been around anyone with a positive COVID-19 test?*   (  ) Yes   (  ) No   ( X ) Unknown- Reason (e.g. collateral uncertain, refusing to answer, etc.):  Pt was agitated and was medicated, and has been sleeping   IF YES PROCEED TO QUESTION #2. IF NO or UNKNOWN, PLEASE SKIP TO QUESTION #7  2.	Was the patient within 6 feet of them for at least 15 minutes? (  ) Yes   (  ) No   (  ) Unknown- Reason: ______    3.	Did the patient provide care for them? (  ) Yes   (  ) No   (  ) Unknown- Reason: ______    4.	Did the patient have direct physical contact with them (touched, hugged, or kissed them)? (  ) Yes   (  ) No    (  ) Unknown- Reason: ______    5.	Did the patient share eating or drinking utensils with them? (  ) Yes   (  ) No    (  ) Unknown- Reason: ______    6.	Have they sneezed, coughed, or somehow got respiratory droplets on the patient? (  ) Yes   (  ) No    (  ) Unknown- Reason: ______    7.	*Has the patient been out of [place] within the past 14 days?*  (  ) Yes   ( ) No   ( X ) Unknown- Reason (e.g. collateral uncertain, refusing to answer, etc.): Pt was agitated and was medicated, and has been sleeping  IF YES PLEASE ANSWER THE FOLLOWING QUESTIONS:  8.	Which state/country has the patient been to? ______   9.	Was the patient there over 24 hours? (  ) Yes   (  ) No    (  ) Unknown- Reason: ______    10.	What date did the patient return to [facility]? ______     ===================  PRE-HOSPITAL COURSE  ===================  SOURCE: ED Documentation  DETAILS: “Pt biba for psych evaluation. as per EMS, pt missed stopped, was questioned by [facility] police, found out she has outstanding ticket, and started acting erratic”     ============  ED COURSE   ============  SOURCE: ED Documentation, ED RN   ARRIVAL: Pt BIBA after [facility] police found pt to be acting erratic   BELONGINGS: [employer] permit, 5 metrocards, 2 nys id cards, fedex card, cellphone, visa gift card, soc sec card, pair of earbuds    BEHAVIOR: Pt “BIB [employer] after a verbal altercation with [facility] police. patient states that she is suing the [facility] because there are people using her identity to buy metrocards. As a result the [facility] is targeting her and giving her multiple tickets as a consequence” Pt was agitated in triage and refusing for vitals to be taken. Security and other staff were present, and pt was medicated for pt and staff safety. Pt continued to present with erratic behavior, and additional medication was administered. Per ED RN, this morning pt has been mainly sleeping. Pt was offered food during ED course but pt declined.  TREATMENT: haloperidol lactate 5 mG/mL Injection, 1 milliLiter(s) at ~3:50, diphenhydrAMINE 50 mG/mL Injectable, 1 milliLiter(s) at ~3:50, haloperidol lactate 5 mG/mL Injection, 1 milliLiter(s) at ~5:00, LORazepam 2 mG/mL Injectable, 1 milliLiter(s) at ~5:00  VISITORS: None

## 2020-10-24 NOTE — ED BEHAVIORAL HEALTH ASSESSMENT NOTE - DESCRIPTION
Pt reported to have been aggressive and agitated. Reportedly making odd statements of how [employer] was persecuting her and about how [employer] was allowing ppl to steal her identity.      COVID Exposure Screen- Patient    1.	*In the past 14 days, have you been around anyone with a positive COVID-19 test?*   (  ) Yes   (  ) No   ( x ) Unknown- Reason (e.g. patient uncertain, sedated, refusing to answer, etc.):  _sedated_____  IF YES PROCEED TO QUESTION #2. IF NO or UNKNOWN, PLEASE SKIP TO QUESTION #7  2.	Were you within 6 feet of them for at least 15 minutes? (  ) Yes   (  ) No   (  ) Unknown- Reason: ______    3.	Have you provided care for them? (  ) Yes   (  ) No   (  ) Unknown- Reason: ______    4.	Have you had direct physical contact with them (touched, hugged, or kissed them)? (  ) Yes   (  ) No    (  ) Unknown- Reason: ______    5.	Have you shared eating or drinking utensils with them? (  ) Yes   (  ) No    (  ) Unknown- Reason: ______    6.	Have they sneezed, coughed, or somehow got respiratory droplets on you? (  ) Yes   (  ) No    (  ) Unknown- Reason: ______    7.	*Have you been out of [place] within the past 14 days?*  (  ) Yes   (  ) No   ( x ) Unknown- Reason (e.g. patient uncertain, sedated, refusing to answer, etc.): __sedated_____  IF YES PLEASE ANSWER THE FOLLOWING QUESTIONS:  8.	Which state/country have you been to? ______   9.	Were you there over 24 hours? (  ) Yes   (  ) No    (  ) Unknown- Reason: ______    10.	What date did you return to [facility]? ______ unk

## 2020-10-24 NOTE — ED ADULT NURSE NOTE - NSIMPLEMENTINTERV_GEN_ALL_ED
Implemented All Fall Risk Interventions:  [place] to call system. Call bell, personal items and telephone within reach. Instruct patient to call for assistance. Room bathroom lighting operational. Non-slip footwear when patient is off stretcher. Physically safe environment: no spills, clutter or unnecessary equipment. Stretcher in lowest position, wheels locked, appropriate side rails in place. Provide visual cue, wrist band, yellow gown, etc. Monitor gait and stability. Monitor for mental status changes and reorient to person, place, and time. Review medications for side effects contributing to fall risk. Reinforce activity limits and safety measures with patient and family.

## 2020-10-24 NOTE — ED BEHAVIORAL HEALTH ASSESSMENT NOTE - OTHER PAST PSYCHIATRIC HISTORY (INCLUDE DETAILS REGARDING ONSET, COURSE OF ILLNESS, INPATIENT/OUTPATIENT TREATMENT)
reported diagnosis of schizoaffective d/o - most recent hospitalization at [facility] - per dc summary pt was disorganized, paranoid, aggressive

## 2020-10-24 NOTE — ED ADULT TRIAGE NOTE - CHIEF COMPLAINT QUOTE
Pt biba for psych evaluation. as per EMS, pt missed stopped, was questioned by [facility] police, found out she has outstanding ticket, and started acting erratic.
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
## 2020-10-24 NOTE — ED ADULT NURSE REASSESSMENT NOTE - NS ED NURSE REASSESS COMMENT FT1
After returning from break, at 3:40 am, this pt is in psych room P surrounded by security staff and few ED staff. Pt is loud and agitated, and MD informed this RN that pt needs Haldol and Benadryl. Because this RN did not receive any report at the time, this RN removed meds from Pyxis, and because of pt's behavior,  VIRGIE Croft offered to administer the IM meds to pt, as this RN left the room.

## 2020-10-25 ENCOUNTER — INPATIENT (INPATIENT)
Facility: HOSPITAL | Age: 45
LOS: 15 days | Discharge: HOME | End: 2020-11-10
Attending: PSYCHIATRY & NEUROLOGY | Admitting: PSYCHIATRY & NEUROLOGY
Payer: MEDICAID

## 2020-10-25 VITALS
SYSTOLIC BLOOD PRESSURE: 113 MMHG | TEMPERATURE: 97 F | DIASTOLIC BLOOD PRESSURE: 82 MMHG | HEART RATE: 87 BPM | RESPIRATION RATE: 18 BRPM

## 2020-10-25 VITALS
SYSTOLIC BLOOD PRESSURE: 118 MMHG | HEART RATE: 75 BPM | DIASTOLIC BLOOD PRESSURE: 65 MMHG | RESPIRATION RATE: 18 BRPM | TEMPERATURE: 97 F | OXYGEN SATURATION: 98 %

## 2020-10-25 DIAGNOSIS — F25.9 SCHIZOAFFECTIVE DISORDER, UNSPECIFIED: ICD-10-CM

## 2020-10-25 LAB
SARS-COV-2 IGG SERPL QL IA: NEGATIVE — SIGNIFICANT CHANGE UP
SARS-COV-2 IGM SERPL IA-ACNC: <0.1 INDEX — SIGNIFICANT CHANGE UP
SARS-COV-2 RNA SPEC QL NAA+PROBE: SIGNIFICANT CHANGE UP

## 2020-10-25 RX ORDER — HALOPERIDOL DECANOATE 100 MG/ML
5 INJECTION INTRAMUSCULAR ONCE
Refills: 0 | Status: COMPLETED | OUTPATIENT
Start: 2020-10-25 | End: 2020-10-25

## 2020-10-25 RX ORDER — HALOPERIDOL DECANOATE 100 MG/ML
5 INJECTION INTRAMUSCULAR EVERY 6 HOURS
Refills: 0 | Status: DISCONTINUED | OUTPATIENT
Start: 2020-10-25 | End: 2020-10-26

## 2020-10-25 RX ORDER — HALOPERIDOL DECANOATE 100 MG/ML
5 INJECTION INTRAMUSCULAR EVERY 6 HOURS
Refills: 0 | Status: DISCONTINUED | OUTPATIENT
Start: 2020-10-25 | End: 2020-11-10

## 2020-10-25 RX ORDER — DIPHENHYDRAMINE HCL 50 MG
50 CAPSULE ORAL EVERY 6 HOURS
Refills: 0 | Status: DISCONTINUED | OUTPATIENT
Start: 2020-10-25 | End: 2020-10-26

## 2020-10-25 RX ORDER — KETAMINE HYDROCHLORIDE 100 MG/ML
25 INJECTION INTRAMUSCULAR; INTRAVENOUS ONCE
Refills: 0 | Status: DISCONTINUED | OUTPATIENT
Start: 2020-10-25 | End: 2020-10-25

## 2020-10-25 RX ORDER — RISPERIDONE 4 MG/1
1 TABLET ORAL
Refills: 0 | Status: DISCONTINUED | OUTPATIENT
Start: 2020-10-25 | End: 2020-10-26

## 2020-10-25 RX ADMIN — HALOPERIDOL DECANOATE 5 MILLIGRAM(S): 100 INJECTION INTRAMUSCULAR at 07:33

## 2020-10-25 RX ADMIN — KETAMINE HYDROCHLORIDE 25 MILLIGRAM(S): 100 INJECTION INTRAMUSCULAR; INTRAVENOUS at 07:33

## 2020-10-25 RX ADMIN — Medication 2 MILLIGRAM(S): at 07:33

## 2020-10-25 NOTE — PROGRESS NOTE BEHAVIORAL HEALTH - NSBHFUPINTERVALHXFT_PSY_A_CORE
· HPI (include illness quality, severity, duration, timing, context, modifying factors, associated signs and symptoms)	43yo with unclear social hx, with reported hx of schizoaffective d/o, cannabis use with no reported/documented hx of SA/SIB, with hx of psych hospitalizations (most recent in [facility] from 8/13 to 9/2) who was B[place] after confrontation with [employer] for disorganized beh/speech. Pt was requested to be seen by psych for reported odd/paranoid statements and agitation.      Pt was attempted to be seen and evaluated. She was noted to be sedated - woke up and responded briefly to her name but unable to keep awake.    ED COURSE:    ED Course (up until assessment):  · Description	Pt reported to have been aggressive and agitated. Reportedly making odd statements of how [employer] was persecuting her and about how [employer] was allowing ppl to steal her identity.      COVID Exposure Screen- Patient    1.	*In the past 14 days, have you been around anyone with a positive COVID-19 test?*   (  ) Yes   (  ) No   ( x ) Unknown- Reason (e.g. patient uncertain, sedated, refusing to answer, etc.):  _sedated_____  IF YES PROCEED TO QUESTION #2. IF NO or UNKNOWN, PLEASE SKIP TO QUESTION #7  2.	Were you within 6 feet of them for at least 15 minutes? (  ) Yes   (  ) No   (  ) Unknown- Reason: ______    3.	Have you provided care for them? (  ) Yes   (  ) No   (  ) Unknown- Reason: ______    4.	Have you had direct physical contact with them (touched, hugged, or kissed them)? (  ) Yes   (  ) No    (  ) Unknown- Reason: ______    5.	Have you shared eating or drinking utensils with them? (  ) Yes   (  ) No    (  ) Unknown- Reason: ______    6.	Have they sneezed, coughed, or somehow got respiratory droplets on you? (  ) Yes   (  ) No    (  ) Unknown- Reason: ______    7.	*Have you been out of [place] within the past 14 days?*  (  ) Yes   (  ) No   ( x ) Unknown- Reason (e.g. patient uncertain, sedated, refusing to answer, etc.): __sedated_____  IF YES PLEASE ANSWER THE FOLLOWING QUESTIONS:  8.	Which state/country have you been to? ______   9.	Were you there over 24 hours? (  ) Yes   (  ) No    (  ) Unknown- Reason: ______    10.	What date did you return to [place]? ______

## 2020-10-25 NOTE — H&P ADULT - NSHPLABSRESULTS_GEN_ALL_CORE
13.5   5.91  )-----------( 249      ( 24 Oct 2020 05:19 )             40.0       10-24    141  |  111<H>  |  18  ----------------------------<  99  5.2   |  28  |  0.97    Ca    8.8      24 Oct 2020 05:19                    Urinalysis Basic - ( 24 Oct 2020 09:10 )    Color: Yellow / Appearance: Clear / S.010 / pH: x  Gluc: x / Ketone: Negative  / Bili: Negative / Urobili: Negative mg/dL   Blood: x / Protein: Negative mg/dL / Nitrite: Negative   Leuk Esterase: Negative / RBC: x / WBC x   Sq Epi: x / Non Sq Epi: x / Bacteria: x
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
## 2020-10-25 NOTE — ED BEHAVIORAL HEALTH NOTE - BEHAVIORAL HEALTH NOTE
Reassessed patient. Per nurse, patient has been calm and in good behavioral control since prior evaluation, not requiring any medications, no signs of withdrawal. She is currently sleeping. As it would not be in her clinical interest to wake her at this time, will allow patient to sleep and defer next interview until morning. Covid has stlil not resulted as of the time of this writing. Reassessed patient. Per nurse, patient has been calm and in good behavioral control since prior evaluation, not requiring any medications, no signs of withdrawal. She is currently sleeping. As it would not be in her clinical interest to wake her at this time, will allow patient to sleep and defer next interview until morning. Covid has still not resulted as of the time of this writing.
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
## 2020-10-25 NOTE — ED BEHAVIORAL HEALTH NOTE - BEHAVIORAL HEALTH NOTE
Patient reassessed. Per nurse, she was agitated earlier, received haldol 5mg and ativan 2mg IM at 2135 for agitation, continued to be agitated so she received ketamine 25mg IM at 2327. Per nurse she has been sleeping since then. Patient is currently sleeping and given significant agitation it does not appear to be in patients clinical best interest to be woken at this time. Will continue with plan for involuntary admission to [place].

## 2020-10-25 NOTE — PROGRESS NOTE BEHAVIORAL HEALTH - NSBHCHARTREVIEWVS_PSY_A_CORE FT
Vital Signs Last 24 Hrs  T(C): 36.1 (25 Oct 2020 10:48), Max: 36.8 (24 Oct 2020 23:41)  T(F): 96.9 (25 Oct 2020 10:48), Max: 98.2 (24 Oct 2020 23:41)  HR: 87 (25 Oct 2020 10:48) (75 - 116)  BP: 113/82 (25 Oct 2020 10:48) (113/82 - 147/93)  BP(mean): --  RR: 18 (25 Oct 2020 10:48) (18 - 18)  SpO2: 98% (25 Oct 2020 08:06) (98% - 100%)

## 2020-10-25 NOTE — PATIENT PROFILE BEHAVIORAL HEALTH - NSCAGESTDRUGEYEOP_GEN_A_CORE_SD
Pt. Malissa by MD. Order Ativan 0.5 MG IVP Admin , Patient Sleeping Comfortable @ this time , Pulse 100, O2SAT 92 WITH 02 - 4 L NC ,Respiratory Tx Adm, by Resp. Therapist, Continue to assess and Monitor, no

## 2020-10-26 PROCEDURE — 99232 SBSQ HOSP IP/OBS MODERATE 35: CPT

## 2020-10-26 RX ORDER — DIPHENHYDRAMINE HCL 50 MG
50 CAPSULE ORAL EVERY 6 HOURS
Refills: 0 | Status: DISCONTINUED | OUTPATIENT
Start: 2020-10-26 | End: 2020-11-10

## 2020-10-26 RX ORDER — RISPERIDONE 4 MG/1
2 TABLET ORAL
Refills: 0 | Status: DISCONTINUED | OUTPATIENT
Start: 2020-10-26 | End: 2020-11-09

## 2020-10-26 NOTE — CONSULT NOTE ADULT - ASSESSMENT
This is a 44 Female with a past medical history of schizo-affective disorder, brought in to the [place] ER on 10/24 by [facility] after verbal altercation with [facility] police. Patient being admitted for Aggressive behavior       #Schizo-affective Disorder c/b Aggression   - plan per psychiatry team     #Sinus Tachycardia   - likely related to agitation/aggression  - EKG with sinus rhythm     Labs reviewed, please reconsult Medicine prn.       Joy Garrido, DO

## 2020-10-26 NOTE — PROGRESS NOTE BEHAVIORAL HEALTH - SUMMARY
43yo F with unclear social hx (domicile, employment, marital status) with past documented hx of schzioaffective d/o, cannabis use w/ most recent psych hospitalization in Aug 2020 in [facility]. In ED Patient presented with reported disorganized behavior, persecutory and paranoid statements. Patient had also been noted to be aggressive and agitated requiring multiple prns inc haldol 10mg total, ativan 2mg and benadryl 50mg.   On reassessment, patient has had was found to be superficially cooperative, reporting she is well and without need for psychiatric treatment. She is found to be disheveled with pressured speech, perseverating that she is here because the state is trying to prevent her from brining a suit against the [employer]. Given her current presentation and past history, she is currently unsafe from discharge and should remain in IPP.     # Schizoaffective d/o  - disheveled appearance, perseverative thoughts, paranoid  - increased risperidone to 2mg po BID  - patient refused medications this morning    # Acute agitation  For acute agitation not responsive to verbal redirection may give patient Haldol 5mg po q6, Ativan 5mg po q6 and benadryl 50 po q6 prn for agitation with escalation to IM if patient becomes a danger to him/herself/others or property.
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
## 2020-10-26 NOTE — PROGRESS NOTE BEHAVIORAL HEALTH - VIOLENCE RISK FACTORS:
Impulsivity/Irritability/Feeling of being under threat and being unable to control threat/Noncompliance with treatment

## 2020-10-26 NOTE — CONSULT NOTE ADULT - SUBJECTIVE AND OBJECTIVE BOX
ARTURO KENRICK  44y, Female  Allergy: aspirin (Unknown)  fish (Short breath)  ibuprofen (Unknown)  Nuts (Other)    Hospital Day: 1d    This is a 44 Female with a past medical history of schizo-affective disorder, brought in to the [facility] on 10/24 by [facility] after verbal altercation with [facility] police. Patient was transferred to [facility] on 10/25. Patient was seen at bedside and is uncooperative and verbally abusive. Patient refuses to answer and comments "go and ask your mother".     Internal Medicine consulted for management of chronic medical conditions. No existing conditions, and patient denies any complaints.     PMH/PSH:  PAST MEDICAL & SURGICAL HISTORY:  Schizophrenia        LAST 24-Hr EVENTS:    VITALS:  T(F): 98 (10-26-20 @ 15:00), Max: 98 (10-26-20 @ 15:00)  HR: 77 (10-26-20 @ 15:00)  BP: 107/59 (10-26-20 @ 15:00) (107/59 - 109/75)  RR: 18 (10-26-20 @ 15:00)  SpO2: --        TESTS & MEASUREMENTS:  Weight (Kg):   BMI (kg/m2): 18.2 (10-24)                                COVID-19 PCR: NotDetec (10-24-20 @ 07:39)      RADIOLOGY, ECG, & ADDITIONAL TESTS:  12 Lead ECG:   Ventricular Rate 107 BPM    Atrial Rate 107 BPM    P-R Interval 130 ms    QRS Duration 80 ms    Q-T Interval 350 ms    QTC Calculation(Bazett) 467 ms    P Axis 88 degrees    R Axis 63 degrees    T Axis 57 degrees    Diagnosis Line Sinus tachycardia  Possible Left atrial enlargement  Nonspecific ST abnormality  Abnormal ECG  No previous ECGs available  Confirmed by Lang Mcclellan MD ([id number]) on 10/25/2020 4:43:04 PM (10-24-20 @ 23:40)      RECENT DIAGNOSTIC ORDERS:      MEDICATIONS:  MEDICATIONS  (STANDING):  risperiDONE   Tablet 2 milliGRAM(s) Oral two times a day    MEDICATIONS  (PRN):  diphenhydrAMINE 50 milliGRAM(s) Oral every 6 hours PRN agitation  haloperidol     Tablet 5 milliGRAM(s) Oral every 6 hours PRN agitation  LORazepam     Tablet 2 milliGRAM(s) Oral every 6 hours PRN physical aggression      HOME MEDICATIONS:      PHYSICAL EXAM:  GENERAL: A&O x3,  in NAD  HNENT: EOMI, PERRLA    NECK: No LAD/swelling  CHEST/LUNG:  CTAB no wheezes/rales/ronchi  HEART: RRR, No murmurs  ABDOMEN: Soft, NT, ND,  Bowel sounds present  EXTREMITIES:  Warm well perfused, no edema

## 2020-10-26 NOTE — CHART NOTE - NSCHARTNOTEFT_GEN_A_CORE
Social Work Admit Note:    Patient is 44 years of age female who was admitted for evaluation if agitated and disorganized behavior.  She was brought to the hospital by EMS [place]  [place] [place] patient was making statements regarding the [employer] persecuting her and that the [employer] was allowing people to steal her identity.      In the community patient reported to have been in different [facility] shelter locations in [place].  Department of Homeless Services was contacted.  There is no record of patient being in any of their shelter locations.  Treatment history has been for schizoaffective disorder and cannabis use.  Patient’s most recent inpatient psychiatric hospitalization was at [facility] in August of this year.      Patient does not provide any contact information for family or other social supports.  Baseline functioning not known at this time.  No known history of suicide attempts or self-injurious behavior.       Sexual History – patient identifies as heterosexual.   	  Family relationships and history – unknown as patient will not disclose      Leisure Activity Assessment – not disclosed         Community Supports – No known attendance in any self- help groups or other organizations.     Employment – patient is not employed     Substance Use Assessment – use of cannabis weeks ago endorsed.        History of suicidality or self- injurious behaviors – no known history         Significant Loses – None identified.      Life Goals – not disclosed     will continue to meet with patient 1:1 and with treatment team daily.  Discharge plan is for continued mental health treatment in outpatient setting.      Please refer to Social Work Psychosocial for additional information.

## 2020-10-26 NOTE — PROGRESS NOTE BEHAVIORAL HEALTH - NSBHCHARTREVIEWINVESTIGATE_PSY_A_CORE FT
< from: 12 Lead ECG (10.24.20 @ 23:40) >    QTC Calculation(Bazett) 467 ms    Diagnosis Line Sinus tachycardia  Possible Left atrial enlargement  Nonspecific ST abnormality  Abnormal ECG  No previous ECGs available  Confirmed by Lang Mcclellan MD ([id number]) on 10/25/2020 4:43:04 PM    < end of copied text >

## 2020-10-26 NOTE — PROGRESS NOTE BEHAVIORAL HEALTH - NSBHADDHXPSYCHFT_PSY_A_CORE
unclear: eported hx of schizoaffective d/o, cannabis use with no reported/documented hx of SA/SIB, with hx of psych hospitalizations (most recent in [facility] from 8/13 to 9/2) unclear: eported hx of schizoaffective d/o, cannabis use with no reported/documented hx of SA/SIB, with hx of psych hospitalizations (most recent in [facility] from 8/13 to 9/2). As per chart review, pt was discharged on Risperdal 4 mg qhs.   Per chart review: past medication trials: Zyprexa 10mg daily, Remeron 30mg daily, Risperdal 3mg daily, Nicotine Polacrilex 2mg 5.5 daily, Invega Sustenna 234mg IM, Lamictal 25mg BID    Per chart review: Pt used to follow up at the [facility] from 11/30/2017- 2/6/2018; her case was also closed at [facility] due to no show appts    Per Psyckes: 1 Psych ER visit at [facility] last 7/16/2017

## 2020-10-26 NOTE — PROGRESS NOTE BEHAVIORAL HEALTH - NSBHCHARTREVIEWVS_PSY_A_CORE FT
Vital Signs Last 24 Hrs  T(C): 36.2 (26 Oct 2020 08:09), Max: 36.2 (26 Oct 2020 08:09)  T(F): 97.2 (26 Oct 2020 08:09), Max: 97.2 (26 Oct 2020 08:09)  HR: 109 (26 Oct 2020 08:09) (109 - 109)  BP: 109/75 (26 Oct 2020 08:09) (109/75 - 109/75)  BP(mean): --  RR: 16 (26 Oct 2020 08:09) (16 - 16)  SpO2: --

## 2020-10-27 DIAGNOSIS — F25.9 SCHIZOAFFECTIVE DISORDER, UNSPECIFIED: ICD-10-CM

## 2020-10-27 PROCEDURE — 99232 SBSQ HOSP IP/OBS MODERATE 35: CPT

## 2020-10-27 RX ORDER — ACETAMINOPHEN 500 MG
650 TABLET ORAL EVERY 6 HOURS
Refills: 0 | Status: DISCONTINUED | OUTPATIENT
Start: 2020-10-27 | End: 2020-11-10

## 2020-10-27 RX ADMIN — Medication 650 MILLIGRAM(S): at 02:35

## 2020-10-27 RX ADMIN — Medication 650 MILLIGRAM(S): at 02:00

## 2020-10-27 NOTE — PROGRESS NOTE BEHAVIORAL HEALTH - NSBHFUPINTERVALHXFT_PSY_A_CORE
Patient seen and evaluated in teams. Per nursing staff, patient refused to take medications last night.    On evaluation patient initially minimally responsive to our questions, reporting she is fine, and good. She reports she is not taking her medications because she does not need them. She then looks at attending and refuses to talk, stating that she (Amadou) is getting negative energy from attending. She refuses to engage, at which point we thank her for coming. Ms. Rahman becomes irritated and verbally aggressive at that point, stating she does not wish to answer questions every day. she faces wall and begins with long tangental, uninterruptible statements about how she is here for no reason, that the [employer] stole her property and that she has a supreme court case against the [employer]. We ask if she has an alternative name as we were unable to find her in the shelter system to which she smirks and begins stating her name, the spelling , her birthplace, her children's name, spelling, birthplace and previous address. Additional Meaningful information was unable to be attained.

## 2020-10-27 NOTE — PROGRESS NOTE BEHAVIORAL HEALTH - NSBHCHARTREVIEWVS_PSY_A_CORE FT
ICU Vital Signs Last 24 Hrs  T(C): 36.9 (27 Oct 2020 08:02), Max: 36.9 (27 Oct 2020 08:02)  T(F): 98.4 (27 Oct 2020 08:02), Max: 98.4 (27 Oct 2020 08:02)  HR: 103 (27 Oct 2020 08:02) (77 - 103)  BP: 99/63 (27 Oct 2020 08:02) (93/69 - 107/59)  BP(mean): --  ABP: --  ABP(mean): --  RR: 18 (27 Oct 2020 08:02) (18 - 18)  SpO2: --

## 2020-10-27 NOTE — PROGRESS NOTE BEHAVIORAL HEALTH - SUMMARY
45yo F with unclear social hx (domicile, employment, marital status) with past documented hx of schzioaffective d/o, cannabis use w/ most recent psych hospitalization in Aug 2020 in [facility]. In ED Patient presented with reported disorganized behavior, persecutory and paranoid statements. Patient had also been noted to be aggressive and agitated requiring multiple prns inc haldol 10mg total, ativan 2mg and benadryl 50mg.   On reassessment, patient has had was found to be superficially cooperative, reporting she is well and without need for psychiatric treatment. She is found to be disheveled with pressured speech, perseverating that she is here because the state is trying to prevent her from brining a suit against the [employer]. Given her current presentation and past history, she is currently unsafe from discharge and should remain in IPP.     # Schizoaffective d/o  - Continue to offer risperidone 2mg po BID  - patient refuses all medications; will start process for TOO    # Acute agitation  For acute agitation not responsive to verbal redirection may give patient Haldol 5mg po q6, Ativan 5mg po q6 and benadryl 50 po q6 prn for agitation with escalation to IM if patient becomes a danger to him/herself/others or property.

## 2020-10-28 LAB
A1C WITH ESTIMATED AVERAGE GLUCOSE RESULT: 5.8 % — HIGH (ref 4–5.6)
ALBUMIN SERPL ELPH-MCNC: 4.3 G/DL — SIGNIFICANT CHANGE UP (ref 3.5–5.2)
ALP SERPL-CCNC: 60 U/L — SIGNIFICANT CHANGE UP (ref 30–115)
ALT FLD-CCNC: 42 U/L — HIGH (ref 0–41)
ANION GAP SERPL CALC-SCNC: 7 MMOL/L — SIGNIFICANT CHANGE UP (ref 7–14)
AST SERPL-CCNC: 38 U/L — SIGNIFICANT CHANGE UP (ref 0–41)
BASOPHILS # BLD AUTO: 0.04 K/UL — SIGNIFICANT CHANGE UP (ref 0–0.2)
BASOPHILS NFR BLD AUTO: 1 % — SIGNIFICANT CHANGE UP (ref 0–1)
BILIRUB SERPL-MCNC: 0.3 MG/DL — SIGNIFICANT CHANGE UP (ref 0.2–1.2)
BUN SERPL-MCNC: 16 MG/DL — SIGNIFICANT CHANGE UP (ref 10–20)
CALCIUM SERPL-MCNC: 9.7 MG/DL — SIGNIFICANT CHANGE UP (ref 8.5–10.1)
CHLORIDE SERPL-SCNC: 103 MMOL/L — SIGNIFICANT CHANGE UP (ref 98–110)
CHOLEST SERPL-MCNC: 223 MG/DL — HIGH
CO2 SERPL-SCNC: 29 MMOL/L — SIGNIFICANT CHANGE UP (ref 17–32)
CREAT SERPL-MCNC: 0.8 MG/DL — SIGNIFICANT CHANGE UP (ref 0.7–1.5)
EOSINOPHIL # BLD AUTO: 0.23 K/UL — SIGNIFICANT CHANGE UP (ref 0–0.7)
EOSINOPHIL NFR BLD AUTO: 5.6 % — SIGNIFICANT CHANGE UP (ref 0–8)
ESTIMATED AVERAGE GLUCOSE: 120 MG/DL — HIGH (ref 68–114)
GLUCOSE SERPL-MCNC: 103 MG/DL — HIGH (ref 70–99)
HCG UR QL: NEGATIVE — SIGNIFICANT CHANGE UP
HCT VFR BLD CALC: 46.7 % — SIGNIFICANT CHANGE UP (ref 37–47)
HDLC SERPL-MCNC: 92 MG/DL — SIGNIFICANT CHANGE UP
HGB BLD-MCNC: 15.7 G/DL — SIGNIFICANT CHANGE UP (ref 12–16)
IMM GRANULOCYTES NFR BLD AUTO: 0.2 % — SIGNIFICANT CHANGE UP (ref 0.1–0.3)
LIPID PNL WITH DIRECT LDL SERPL: 119 MG/DL — HIGH
LYMPHOCYTES # BLD AUTO: 1.45 K/UL — SIGNIFICANT CHANGE UP (ref 1.2–3.4)
LYMPHOCYTES # BLD AUTO: 35.1 % — SIGNIFICANT CHANGE UP (ref 20.5–51.1)
MCHC RBC-ENTMCNC: 32.1 PG — HIGH (ref 27–31)
MCHC RBC-ENTMCNC: 33.6 G/DL — SIGNIFICANT CHANGE UP (ref 32–37)
MCV RBC AUTO: 95.5 FL — SIGNIFICANT CHANGE UP (ref 81–99)
MONOCYTES # BLD AUTO: 0.38 K/UL — SIGNIFICANT CHANGE UP (ref 0.1–0.6)
MONOCYTES NFR BLD AUTO: 9.2 % — SIGNIFICANT CHANGE UP (ref 1.7–9.3)
NEUTROPHILS # BLD AUTO: 2.02 K/UL — SIGNIFICANT CHANGE UP (ref 1.4–6.5)
NEUTROPHILS NFR BLD AUTO: 48.9 % — SIGNIFICANT CHANGE UP (ref 42.2–75.2)
NON HDL CHOLESTEROL: 131 MG/DL — HIGH
NRBC # BLD: 0 /100 WBCS — SIGNIFICANT CHANGE UP (ref 0–0)
PLATELET # BLD AUTO: 261 K/UL — SIGNIFICANT CHANGE UP (ref 130–400)
POTASSIUM SERPL-MCNC: 4.3 MMOL/L — SIGNIFICANT CHANGE UP (ref 3.5–5)
POTASSIUM SERPL-SCNC: 4.3 MMOL/L — SIGNIFICANT CHANGE UP (ref 3.5–5)
PROT SERPL-MCNC: 7.2 G/DL — SIGNIFICANT CHANGE UP (ref 6–8)
RBC # BLD: 4.89 M/UL — SIGNIFICANT CHANGE UP (ref 4.2–5.4)
RBC # FLD: 12.7 % — SIGNIFICANT CHANGE UP (ref 11.5–14.5)
SODIUM SERPL-SCNC: 139 MMOL/L — SIGNIFICANT CHANGE UP (ref 135–146)
TRIGL SERPL-MCNC: 95 MG/DL — SIGNIFICANT CHANGE UP
TSH SERPL-MCNC: 1.69 UIU/ML — SIGNIFICANT CHANGE UP (ref 0.27–4.2)
WBC # BLD: 4.13 K/UL — LOW (ref 4.8–10.8)
WBC # FLD AUTO: 4.13 K/UL — LOW (ref 4.8–10.8)

## 2020-10-28 PROCEDURE — 99232 SBSQ HOSP IP/OBS MODERATE 35: CPT

## 2020-10-28 NOTE — CHART NOTE - NSCHARTNOTEFT_GEN_A_CORE
Social Work Note:    Treatment team met with patient to discuss treatment plan, medications and discharge plan.  At time of assessment patient was able to engage but expressed paranoia throughout interview.  She informed treatment team that she has multiple law suits that she is pursuing.  During the day patient is observed to be visible on the unit.  Suicidal / homicidal ideation denied.  Audio / visual hallucinations denied.      Yesterday patient attended a treatment team meeting.  During that meeting patient had poor eye contact.  She was not fully cooperative with interview.      Patient does not provide any family or other social supports for contact.  She informed she has a phone but that it is broken.  [employer] was offered as a resource for patient to apply for a phone.  She was agreeable to receive the information but will not call as she does “not trust the [employer].”    Plan is for treatment over objection as patient is not agreeable to medications prescribed.      Mental Status Exam:    Mood – Irritable   Sleep - Good  Appetite - Good  ADLs – Fair   Thought Process – illogical   Observation – [id number]    No barriers to discharge identified at this time.     At this time patient is not psychiatrically stable for discharge.

## 2020-10-28 NOTE — PROGRESS NOTE BEHAVIORAL HEALTH - NSBHCHARTREVIEWVS_PSY_A_CORE FT
ICU Vital Signs Last 24 Hrs  T(C): 36.2 (28 Oct 2020 08:33), Max: 36.7 (27 Oct 2020 16:07)  T(F): 97.2 (28 Oct 2020 08:33), Max: 98 (27 Oct 2020 16:07)  HR: 86 (28 Oct 2020 08:33) (80 - 87)  BP: 103/65 (28 Oct 2020 08:33) (92/50 - 108/76)  BP(mean): --  ABP: --  ABP(mean): --  RR: 18 (28 Oct 2020 08:33) (18 - 20)  SpO2: --

## 2020-10-28 NOTE — PROGRESS NOTE BEHAVIORAL HEALTH - NSBHFUPINTERVALHXFT_PSY_A_CORE
Pt was seen, evaluated, chart reviewed.  As per nursing staff, pt continues to refuse all medications. On evaluation, pt reports that she is doing "good." States that she doesn't need any medication and reports that Risperdal has caused her to have "sharp pain in her hand." States "there was a time when I needed risperdal, but I do not now. I don't have a mental illness." Pt is superficially cooperative. Endorsed good sleep and appetite. Denied A/V hallucinations. Denied suicidal/homicidal ideation, intent or plan. Pt appears paranoid, stating that she does not have a phone. When she was offered to receive a phone through [employer], pt stated 'I don't trust the [employer]." Then proceeded to ask about being able to get "court papers" out of her belongings and keeps wanting to "file law suits." Remains delusional and paranoid with poor insight and judgement. Pt also stated she would not want the treatment team to talk to any of her family members as she "doesn't trust them."

## 2020-10-28 NOTE — PROGRESS NOTE BEHAVIORAL HEALTH - SUMMARY
45yo F with unclear social hx (domicile, employment, marital status) with past documented hx of schzioaffective d/o, cannabis use w/ most recent psych hospitalization in Aug 2020 in [facility]. In ED Patient presented with reported disorganized behavior, persecutory and paranoid statements. Patient had also been noted to be aggressive and agitated requiring multiple prns inc haldol 10mg total, ativan 2mg and benadryl 50mg.   On reassessment, patient has had was found to be superficially cooperative, reporting she is well and without need for psychiatric treatment. She is found to be disheveled with pressured speech, perseverating that she is here because the state is trying to prevent her from brining a suit against the [employer]. Given her current presentation and past history, she is currently unsafe from discharge and should remain in IPP.     # Schizoaffective d/o  - Continue to offer risperidone 2mg po BID  - patient refuses all medications; will start process for TOO    # Acute agitation  For acute agitation not responsive to verbal redirection may give patient Haldol 5mg po q6, Ativan 5mg po q6 and benadryl 50 po q6 prn for agitation with escalation to IM if patient becomes a danger to him/herself/others or property.

## 2020-10-28 NOTE — CHART NOTE - NSCHARTNOTEFT_GEN_A_CORE
Social Work Note:    Treatment team met with patient to discuss treatment plan, medications and discharge plan. At time of assessment patient endorsed better sleep and somewhat better mood.  Eye contact during interview was improved from previous days.  Patient has been making an effort to attend and actively participate in groups.  He verbalized benefit from group attendance.      Treatment team meeting held with patient last week.  Goal of attending two groups per day despite low energy and mood discussed.  Patient has been making an effort to meet this goal and has been attending all available groups.  Referral to [facility] discussed with patient.  He was interviewed by [facility]  April on Friday 10/23.   Prior to admission patient was in treatment at [facility].  There he was seen by psychiatrist Dr. Chapman but not a therapist per his report.        This maintains contact with patient's sister Radha via telephone [phone] with last contact on 10/27.  She informed that she has been in contact with patient and that he has been sounding a little better.  At baseline patient is not as depressed and not suicidal.      Last week patient was educated to health home referral.  He was agreeable to same and referral was sent.  This was discussed again today.  Patient was educated to adult home facilities as a possible future housing option.      Mental Status Exam:    Mood – Depressed  Sleep – Fair   Appetite – Fair   ADLs – Fair  Thought Process – Linear    Observation – [id number]    No barriers to discharge identified at this time.

## 2020-10-29 PROCEDURE — 99232 SBSQ HOSP IP/OBS MODERATE 35: CPT

## 2020-10-29 RX ORDER — ACETAMINOPHEN 500 MG
650 TABLET ORAL ONCE
Refills: 0 | Status: COMPLETED | OUTPATIENT
Start: 2020-10-29 | End: 2020-10-29

## 2020-10-29 NOTE — CHART NOTE - NSCHARTNOTEFT_GEN_A_CORE
Met with patient and MHLS  on phone as part of procedure for treatment over objection.    Pt is psychotic; evidencing paranoia and persecutory delusions. Agree with plan as proposed.

## 2020-10-29 NOTE — PROGRESS NOTE BEHAVIORAL HEALTH - SUMMARY
45yo F with unclear social hx (domicile, employment, marital status) with past documented hx of schzioaffective d/o, cannabis use w/ most recent psych hospitalization in Aug 2020 in [facility]. In ED Patient presented with reported disorganized behavior, persecutory and paranoid statements. Patient had also been noted to be aggressive and agitated requiring multiple prns inc haldol 10mg total, ativan 2mg and benadryl 50mg.   Evaluation today found patient to continue to be superficially cooperative, reporting she is well, without need for psychiatric treatment and demanding to be discharged today. She is found to be disheveled, preoccupied with her legal papers both mentally and physically but now with a different reason for suit. She is difficult to redirect, persistently agitated and continues to be paranoid that the government is against her.   Given her current presentation and past history, she is currently unsafe from discharge and should remain in IPP.     # Schizoaffective d/o  - Continue to offer risperidone 2mg po BID  - patient refuses all medications; will start process for TOO    # Acute agitation  For acute agitation not responsive to verbal redirection may give patient Haldol 5mg po q6, Ativan 5mg po q6 and benadryl 50 po q6 prn for agitation with escalation to IM if patient becomes a danger to him/herself/others or property.

## 2020-10-29 NOTE — PROGRESS NOTE BEHAVIORAL HEALTH - NSBHCHARTREVIEWVS_PSY_A_CORE FT
Vital Signs Last 24 Hrs  T(C): 37.2 (29 Oct 2020 08:21), Max: 37.2 (29 Oct 2020 08:21)  T(F): 98.9 (29 Oct 2020 08:21), Max: 98.9 (29 Oct 2020 08:21)  HR: 94 (29 Oct 2020 08:21) (86 - 94)  BP: 114/- (29 Oct 2020 08:21) (92/64 - 114/-)  BP(mean): --  RR: 18 (29 Oct 2020 08:21) (18 - 18)  SpO2: -- Vital Signs Last 24 Hrs  T(C): 37.2 (29 Oct 2020 08:21), Max: 37.2 (29 Oct 2020 08:21)  T(F): 98.9 (29 Oct 2020 08:21), Max: 98.9 (29 Oct 2020 08:21)  HR: 94 (29 Oct 2020 08:21) (86 - 94)  BP: 103/65  BP(mean): --  RR: 18 (29 Oct 2020 08:21) (18 - 18)  SpO2: --

## 2020-10-29 NOTE — PROGRESS NOTE BEHAVIORAL HEALTH - NSBHFUPINTERVALHXFT_PSY_A_CORE
Patient seen and evaluated at bedside. Per nursing staff, Ms Tobar continues to refuse all medications.     On encounter today, patient remains superficially cooperative, reports she is doing well and would like to go home. Patient remained preoccupied and paranoid. She reports that she need to get out of here today in order to hand in paper work for court process she is currently working on. Today she reports that the paperwork is "to secure permanent housing", which differs from initial interview where patient reported she was suing the [employer] for abusing her daughter. Ms. Rahman reported she was staying at a shelter with her children, was in the process of acquiring housing and was given apartment "[address]" which she found suspicious because "My soon was born in june, month six and his middle name starts with C". She aggred to allow us to talk to her contact from housing authority.     Throughout the day the patient remained preoccupied with her legal papers, carrying them with her throughout the day and slowly going through them in the day room. She was often unable to be re-directed in relation to her court papers. She remained paranoid and was agitated because she was not being discharged. Patient seen and evaluated at bedside. Per nursing staff, Ms Tobar continues to refuse all medications.     On encounter today, patient remains superficially cooperative, reports she is doing well and would like to go home. Patient remained preoccupied and paranoid. She reports that she need to get out of here today in order to hand in paper work for court process she is currently working on. Today she reports that the paperwork is "to secure permanent housing", which differs from initial interview where patient reported she was suing the [employer] for abusing her daughter. Ms. Rahman reported she was staying at a shelter with her children, was in the process of acquiring housing and was given apartment "[address]" which she found suspicious because "My soon was born in june, month six and his middle name starts with C". She aggred to allow us to talk to her contact from housing authority.     Throughout the day the patient remained preoccupied with her legal papers, carrying them with her throughout the day and slowly going through them in the day room. She was often unable to be re-directed in relation to her court papers. She remained paranoid that the government is after her and was persistently agitated because she was not being discharged.    Ms. Rahman denied auditory or visual hallucinations. Denied suicidal thoughts, plants or ideations. denied homicidal thoughts.

## 2020-10-30 PROCEDURE — 99232 SBSQ HOSP IP/OBS MODERATE 35: CPT

## 2020-10-30 RX ORDER — HALOPERIDOL DECANOATE 100 MG/ML
5 INJECTION INTRAMUSCULAR ONCE
Refills: 0 | Status: COMPLETED | OUTPATIENT
Start: 2020-10-30 | End: 2020-10-30

## 2020-10-30 RX ADMIN — HALOPERIDOL DECANOATE 5 MILLIGRAM(S): 100 INJECTION INTRAMUSCULAR at 09:46

## 2020-10-30 RX ADMIN — Medication 2 MILLIGRAM(S): at 09:05

## 2020-10-30 RX ADMIN — HALOPERIDOL DECANOATE 5 MILLIGRAM(S): 100 INJECTION INTRAMUSCULAR at 09:04

## 2020-10-30 NOTE — PROGRESS NOTE BEHAVIORAL HEALTH - SUMMARY
43yo F with unclear social hx (domicile, employment, marital status) with past documented hx of schzioaffective d/o, cannabis use w/ most recent psych hospitalization in Aug 2020 in [facility]. In ED Patient presented with reported disorganized behavior, persecutory and paranoid statements. Patient had also been noted to be aggressive and agitated requiring multiple prns inc haldol 10mg total, ativan 2mg and benadryl 50mg.   Evaluation today found patient to continue aggressive, irritable, confrontational, and paranoid. She is difficult to redirect and required Haldol and Ativan for management.   Given her current presentation and past history, she is currently unsafe from discharge and should remain in IPP.     # Schizoaffective d/o  - Continue to offer risperidone 2mg po BID  - patient refuses all medications; Plan for TOO Monday    # Acute agitation  For acute agitation not responsive to verbal redirection may give patient Haldol 5mg po q6, Ativan 2mg po q6 and benadryl 50 po q6 prn for agitation with escalation to IM if patient becomes a danger to him/herself/others or property. 45yo F with unclear social hx (domicile, employment, marital status) with past documented hx of schzioaffective d/o, cannabis use w/ most recent psych hospitalization in Aug 2020 in [facility]. In ED Patient presented with reported disorganized behavior, persecutory and paranoid statements. Patient had also been noted to be aggressive and agitated requiring multiple prns inc haldol 10mg total, ativan 2mg and benadryl 50mg.   Evaluation today found patient to continue aggressive, irritable, confrontational, and paranoid. She is difficult to redirect and required Haldol and Ativan for management.   Given her current presentation and past history, she is currently unsafe from discharge and should remain in IPP.     # Schizoaffective d/o  - Continue to offer risperidone 2mg po BID  - patient refuses all medications; Plan for TOO on 11/2    # Acute agitation  For acute agitation not responsive to verbal redirection may give patient Haldol 5mg po q6, Ativan 2mg po q6 and benadryl 50 po q6 prn for agitation with escalation to IM if patient becomes a danger to him/herself/others or property.

## 2020-10-30 NOTE — PROGRESS NOTE BEHAVIORAL HEALTH - NSBHCHARTREVIEWVS_PSY_A_CORE FT
Vital Signs Last 24 Hrs  T(C): 36.7 (29 Oct 2020 16:18), Max: 36.7 (29 Oct 2020 16:18)  T(F): 98.1 (29 Oct 2020 16:18), Max: 98.1 (29 Oct 2020 16:18)  HR: 79 (29 Oct 2020 16:18) (79 - 79)  BP: 105/56 (29 Oct 2020 16:18) (105/56 - 105/56)  BP(mean): --  RR: 18 (29 Oct 2020 16:18) (18 - 18)  SpO2: --

## 2020-10-30 NOTE — CHART NOTE - NSCHARTNOTEFT_GEN_A_CORE
Social Work Note:    Treatment team attempted to meet with patient to discuss treatment plan, medications and discharge plan.  At time of assessment patient was agitated, hostile toward staff and not easily redirected.  In morning nursing report treatment team was informed that patient had an episode earlier this morning of agitation.  Patient was not able to engage in a meaningful way and she expressed paranoia regarding her treatment.  She has been making multiple references to lawsuits that are pending against different organizations.     Earlier this week patient attended a treatment team meeting.  During that meeting patient had poor eye contact.  She was not fully cooperative with interview.      Patient does not provide any family or other social supports for contact.  She informed she has a phone but that it is broken.  [employer] was offered as a resource for patient to apply for a phone.  She was agreeable to receive the information but will not call as she does “not trust the [employer].”    Yesterday this worker contacted Mandi Andrade  [phone].  She advised that patient has history of being in the shelter system with her children.  As patient no longer is with her children a referral to the shelter system for single adult would be indicated.  When appropriate this will be discussed with patient.      Plan is for treatment over objection as patient is not agreeable to medications prescribed.      Mental Status Exam:    Mood – Irritable   Sleep - Good  Appetite - Good  ADLs – Fair   Thought Process – illogical   Observation – [id number]    No barriers to discharge identified at this time.     At this time patient is not psychiatrically stable for discharge.

## 2020-10-30 NOTE — PROGRESS NOTE BEHAVIORAL HEALTH - NSBHFUPINTERVALHXFT_PSY_A_CORE
Patient seen and evaluated at bedside. Per nursing staff, patient has been aggressive all morning, threatening staff and verbally abusive.     Patient was not redirectable and further meaningful information was unable to be attained.   Patient was encountered in hallway stating "I have never been to a mental hospital until I sued the [employer]". Ms. Tobar was found to be agitated, verbally abusive and very confrontational stating "don't' walk on me. Don't fucking walk up on me". She was found to be paranoid, stating "sounds like you are doings some illegal sh*t" as well as "you are trying to commit perjury". Patient was perseverating that she was pregnant despite being informed that previous pregnancy tests were negative. She stated "do you think this is a growth then?" in relation to her stomach. With the above, it was decided patient was a danger to others and offered an IM dose of Haldol 5/ Ativan 2. Pt threatening staff to have everyone arrested if medication was administered but agreed to medication.

## 2020-10-31 RX ORDER — LIDOCAINE 4 G/100G
1 CREAM TOPICAL ONCE
Refills: 0 | Status: COMPLETED | OUTPATIENT
Start: 2020-10-31 | End: 2020-10-31

## 2020-10-31 RX ORDER — SALICYLIC ACID 0.5 %
1 CLEANSER (GRAM) TOPICAL AT BEDTIME
Refills: 0 | Status: DISCONTINUED | OUTPATIENT
Start: 2020-10-31 | End: 2020-11-10

## 2020-10-31 RX ORDER — ACETAMINOPHEN 500 MG
650 TABLET ORAL EVERY 6 HOURS
Refills: 0 | Status: DISCONTINUED | OUTPATIENT
Start: 2020-10-31 | End: 2020-11-10

## 2020-10-31 RX ADMIN — LIDOCAINE 1 PATCH: 4 CREAM TOPICAL at 23:25

## 2020-10-31 RX ADMIN — RISPERIDONE 2 MILLIGRAM(S): 4 TABLET ORAL at 20:30

## 2020-11-01 RX ORDER — LIDOCAINE 4 G/100G
1 CREAM TOPICAL ONCE
Refills: 0 | Status: COMPLETED | OUTPATIENT
Start: 2020-11-01 | End: 2020-11-01

## 2020-11-01 RX ADMIN — LIDOCAINE 1 PATCH: 4 CREAM TOPICAL at 14:51

## 2020-11-01 RX ADMIN — LIDOCAINE 1 PATCH: 4 CREAM TOPICAL at 18:52

## 2020-11-01 RX ADMIN — RISPERIDONE 2 MILLIGRAM(S): 4 TABLET ORAL at 20:19

## 2020-11-01 RX ADMIN — LIDOCAINE 1 PATCH: 4 CREAM TOPICAL at 19:30

## 2020-11-02 PROCEDURE — 99231 SBSQ HOSP IP/OBS SF/LOW 25: CPT

## 2020-11-02 RX ORDER — LIDOCAINE 4 G/100G
1 CREAM TOPICAL ONCE
Refills: 0 | Status: COMPLETED | OUTPATIENT
Start: 2020-11-02 | End: 2020-11-02

## 2020-11-02 RX ADMIN — RISPERIDONE 2 MILLIGRAM(S): 4 TABLET ORAL at 20:37

## 2020-11-02 RX ADMIN — LIDOCAINE 1 PATCH: 4 CREAM TOPICAL at 20:37

## 2020-11-02 RX ADMIN — RISPERIDONE 2 MILLIGRAM(S): 4 TABLET ORAL at 08:49

## 2020-11-02 RX ADMIN — Medication 1 APPLICATION(S): at 20:38

## 2020-11-02 NOTE — CHART NOTE - NSCHARTNOTEFT_GEN_A_CORE
Social Work Note:    Treatment team met with patient earlier today on unit rounds.  Interview occurred in patient’s room.  At time of assessment patient endorsed “doing ok.”  Interview was difficult as patient was not fully cooperative.  She appeared to be easily irritated.  Treatment team was informed in nursing morning report that over the weekend patient was aggressive with staff.  Yesterday patient did not take any medications.  During interview on rounds patients nurse offered patient her medication and she was agreeable to take it at that time.      Treatment team held last week.  During that meeting patient was not cooperative with interview.  Tomorrow another team meeting will be held to review patient’s treatment goals as well as plan for discharge when stable.      Hearing for treatment over objection was scheduled for today.  Patient was aware of same.      Patient does not provide any family or other social supports for contact.  She informed she has a phone but that it is broken.  [employer] was offered as a resource for patient to apply for a phone.  She was agreeable to receive the information but she has not informed if she contacted this resource.      Last week this worker contacted Mandi Andrade  [phone].  She advised that patient has history of being in the shelter system with her children.  As patient no longer is with her children a referral to the shelter system for single adult would be indicated.  When appropriate this will be discussed with patient.      Mental Status Exam:    Mood – Irritable   Sleep – Good   Appetite – Good   ADLs – Fair    Thought Process – Illogical   Observation – [id number]    No barriers to discharge identified at this time.     At this time patient is not psychiatrically stable for discharge.

## 2020-11-02 NOTE — PROGRESS NOTE BEHAVIORAL HEALTH - SUMMARY
43yo F with unclear social hx (domicile, employment, marital status) with past documented hx of schzioaffective d/o, cannabis use w/ most recent psych hospitalization in Aug 2020 in [facility]. In ED Patient presented with reported disorganized behavior, persecutory and paranoid statements. Patient had also been noted to be aggressive and agitated requiring multiple prns inc haldol 10mg total, ativan 2mg and benadryl 50mg.     Evaluation today found patient to be superficially cooperative, continues to be agitated, irritable, confrontational, and paranoid.   Given her current presentation and past history, she is currently unsafe from discharge and should remain in IPP.     # Schizoaffective d/o  - Continue to offer risperidone 2mg po BID  - patient refuses all medications; Plan for TOO on 11/2    # Acute agitation  For acute agitation not responsive to verbal redirection may give patient Haldol 5mg po q6, Ativan 2mg po q6 and benadryl 50 po q6 prn for agitation with escalation to IM if patient becomes a danger to him/herself/others or property. 45yo F with unclear social hx (domicile, employment, marital status) with past documented hx of schzioaffective d/o, cannabis use w/ most recent psych hospitalization in Aug 2020 in [facility]. In ED Patient presented with reported disorganized behavior, persecutory and paranoid statements. Patient had also been noted to be aggressive and agitated requiring multiple prns inc haldol 10mg total, ativan 2mg and benadryl 50mg.     Evaluation today found patient to be superficially cooperative, continues to be agitated, irritable, confrontational, and paranoid.   Given her current presentation and past history, she is currently unsafe from discharge and should remain in IPP.     # Schizoaffective d/o  - Continue risperidone 2mg po BID  - Patient has been inconsistent with taking PO medications. Court was held for TOO today on 11/2/20\.    # Acute agitation  For acute agitation not responsive to verbal redirection may give patient Haldol 5mg po q6, Ativan 2mg po q6 and benadryl 50 po q6 prn for agitation with escalation to IM if patient becomes a danger to him/herself/others or property.

## 2020-11-02 NOTE — PROGRESS NOTE BEHAVIORAL HEALTH - NSBHFUPINTERVALHXFT_PSY_A_CORE
Patient seen and evaluated at bedside. Per nursing staff, patient was note to be aggressive over the weekend but did not require prn medications. Nursing staff reported that patient did take medication Saturday evening but not Sunday.     Patient seen and evaluated at bedside. Patient continued to be superficially coopertive during interview. Patient kept responses short and brief. She reported she was doing well. She reported that medication did not cause her side effects. At one point, undersigned took a step back and knocked over a bottle which caused patient to remark "Now you're knocking stuff over... I'm a little OCD and you're knocking stuff over..." in an irritated manner. When asked if she was having suicidal or homicidal thoughts [facility] with an irked look on her face and replied "No. I don't want to her you or her or her or her or him. I don't want to kill myself. I can't say I 'love love' you all but I love love you all". Asked about AVH she stated "I'm seeing you agitate me. are you seeing the same thing?" to which I replied "maybe a little" and she replied "Oh, because I don't"

## 2020-11-02 NOTE — PROGRESS NOTE BEHAVIORAL HEALTH - NSBHCHARTREVIEWVS_PSY_A_CORE FT
Vital Signs Last 24 Hrs  T(C): 36.7 (02 Nov 2020 08:38), Max: 36.7 (02 Nov 2020 08:38)  T(F): 98.1 (02 Nov 2020 08:38), Max: 98.1 (02 Nov 2020 08:38)  HR: 95 (02 Nov 2020 08:38) (68 - 97)  BP: 139/86 (02 Nov 2020 08:38) (100/64 - 139/86)  BP(mean): --  RR: 20 (02 Nov 2020 08:38) (16 - 20)  SpO2: --

## 2020-11-03 PROCEDURE — 99231 SBSQ HOSP IP/OBS SF/LOW 25: CPT

## 2020-11-03 RX ORDER — PALIPERIDONE 1.5 MG/1
234 TABLET, EXTENDED RELEASE ORAL ONCE
Refills: 0 | Status: COMPLETED | OUTPATIENT
Start: 2020-11-04 | End: 2020-11-04

## 2020-11-03 RX ADMIN — RISPERIDONE 2 MILLIGRAM(S): 4 TABLET ORAL at 20:49

## 2020-11-03 RX ADMIN — Medication 1 APPLICATION(S): at 20:49

## 2020-11-03 RX ADMIN — RISPERIDONE 2 MILLIGRAM(S): 4 TABLET ORAL at 08:17

## 2020-11-03 RX ADMIN — LIDOCAINE 1 PATCH: 4 CREAM TOPICAL at 07:30

## 2020-11-03 NOTE — PROGRESS NOTE BEHAVIORAL HEALTH - SUMMARY
45yo F with unclear social hx (domicile, employment, marital status) with past documented hx of schzioaffective d/o, cannabis use w/ most recent psych hospitalization in Aug 2020 in [facility]. In ED Patient presented with reported disorganized behavior, persecutory and paranoid statements. Patient had also been noted to be aggressive and agitated requiring multiple prns inc haldol 10mg total, ativan 2mg and benadryl 50mg.     Evaluation today found patient to be superficially cooperative, continues to be easily agitated, irritable, and paranoid.   Given her current presentation and past history, she is currently unsafe from discharge and should remain in IPP.     # Schizoaffective d/o  - Continue risperidone 2mg po BID  - patient to receive sustenna invega 234mg x1 once available.   - patient to receive sustenna invega 156mg x1 at a later date    # Acute agitation  For acute agitation not responsive to verbal redirection may give patient Haldol 5mg po q6, Ativan 2mg po q6 and benadryl 50 po q6 prn for agitation with escalation to IM if patient becomes a danger to him/herself/others or property. 45yo F with unclear social hx (domicile, employment, marital status) with past documented hx of schzioaffective d/o, cannabis use w/ most recent psych hospitalization in Aug 2020 in [facility]. In ED Patient presented with reported disorganized behavior, persecutory and paranoid statements. Patient had also been noted to be aggressive and agitated requiring multiple prns inc haldol 10mg total, ativan 2mg and benadryl 50mg.     Evaluation today found patient to be superficially cooperative, continues to be easily agitated, irritable, and paranoid.   Given her current presentation and past history, she is currently unsafe from discharge and should remain in IPP.     # Schizoaffective d/o  - Continue risperidone 2mg po BID  - Court held for TOO on 11/2/20  - patient to receive sustenna invega 234mg x1 once available (pt can't refuse due to TOO)  - patient to receive sustenna invega 156mg x1 at a later date    # Acute agitation  For acute agitation not responsive to verbal redirection may give patient Haldol 5mg po q6, Ativan 2mg po q6 and benadryl 50 po q6 prn for agitation with escalation to IM if patient becomes a danger to him/herself/others or property.

## 2020-11-03 NOTE — PROGRESS NOTE BEHAVIORAL HEALTH - NSBHCHARTREVIEWVS_PSY_A_CORE FT
Vital Signs Last 24 Hrs  T(C): 35.6 (03 Nov 2020 15:35), Max: 35.6 (03 Nov 2020 15:35)  T(F): 96 (03 Nov 2020 15:35), Max: 96 (03 Nov 2020 15:35)  HR: 97 (03 Nov 2020 15:35) (86 - 97)  BP: 101/65 (03 Nov 2020 15:35) (101/65 - 118/65)  BP(mean): --  RR: 18 (03 Nov 2020 15:35) (18 - 18)  SpO2: --

## 2020-11-04 DIAGNOSIS — F25.9 SCHIZOAFFECTIVE DISORDER, UNSPECIFIED: ICD-10-CM

## 2020-11-04 PROCEDURE — 99231 SBSQ HOSP IP/OBS SF/LOW 25: CPT

## 2020-11-04 RX ORDER — HALOPERIDOL DECANOATE 100 MG/ML
5 INJECTION INTRAMUSCULAR ONCE
Refills: 0 | Status: COMPLETED | OUTPATIENT
Start: 2020-11-04 | End: 2020-11-04

## 2020-11-04 RX ORDER — DIPHENHYDRAMINE HCL 50 MG
50 CAPSULE ORAL ONCE
Refills: 0 | Status: COMPLETED | OUTPATIENT
Start: 2020-11-04 | End: 2020-11-04

## 2020-11-04 RX ADMIN — HALOPERIDOL DECANOATE 5 MILLIGRAM(S): 100 INJECTION INTRAMUSCULAR at 12:37

## 2020-11-04 RX ADMIN — PALIPERIDONE 234 MILLIGRAM(S): 1.5 TABLET, EXTENDED RELEASE ORAL at 15:43

## 2020-11-04 RX ADMIN — Medication 2 MILLIGRAM(S): at 12:37

## 2020-11-04 RX ADMIN — RISPERIDONE 2 MILLIGRAM(S): 4 TABLET ORAL at 08:23

## 2020-11-04 RX ADMIN — Medication 1 APPLICATION(S): at 20:22

## 2020-11-04 NOTE — CHART NOTE - NSCHARTNOTEFT_GEN_A_CORE
Pt refused Invega despite multiple attempts by staff to explain to patient the benefits, and that it was court ordered. She continued to refuse, demanding to be d/c'd if she took shot.    Injection was given without incident with manual hold.

## 2020-11-04 NOTE — CHART NOTE - NSCHARTNOTEFT_GEN_A_CORE
Pt becoming increasingly loud and disruptive and menacing.  Staring at staff in provocative manner, loud and yelling racially charged speech, and then calling FBI (?).  No response to verbal limit setting.  Pt accepted IM Haldol 5/ativan 2 mg , and then moments later started to shriek and slam her door repeatedly.  Pt danger to self and others and this point, and placed in locked seclusion for safety of self/others.

## 2020-11-04 NOTE — CHART NOTE - NSCHARTNOTEFT_GEN_A_CORE
Social Work Note:    Treatment team met with patient earlier today on unit rounds.  At time of assessment patient was focused on her discharge from the hospital and informed she had “business to take care of.”  Interview was difficult as she needed to be redirected.  Patient appeared to be guarded during interview.  She has been agreeable to medication prescribed.  Suicidal / homicidal ideation denied.  Audio / visual hallucinations denied.      Treatment team meeting occurred with patient yesterday.  At that time treatment goal of making reality based statements discussed.  Patient advised she has met this goal despite being disorganized and not fully cooperative with discussion of her goals.  At the conclusion of team meeting patient was focused on her discharge.      Hearing for treatment over objection occurred and was granted on 11/02.        Patient does not provide any family or other social supports for contact.  She informed she has a phone but that it is broken.  [employer] was offered as a resource for patient to apply for a phone.  She is not interested in calling as she informed she has money to purchase a phone on her own.     Yesterday Department of Homeless Services was contacted.  A [facility] ID # [id number] was found for the name of Berry Tobar.      Earlier today patient was agitated and disruptive requiring IM medication and seclusion.      Mental Status Exam:    Mood – Irritable   Sleep – Good   Appetite – Good   ADLs – Fair    Thought Process – Illogical   Observation – [id number]    No barriers to discharge identified at this time.     At this time patient is not psychiatrically stable for discharge.

## 2020-11-04 NOTE — PROGRESS NOTE BEHAVIORAL HEALTH - NS ED BHA MED ROS MUSCULOSKELETAL
CC:  Paco Edmonds is here today for Physical , pt states she has no concerns    Medications: medications verified and updated  Refills needed today? NO  complains of  Latex allergy or sensitivity  Tobacco history: verified  Health Maintenance Due   Topic Date Due   â¢ Depression Screening  01/12/2002   â¢ Influenza Vaccine (1) 09/01/2017     Patient is up to date, no discussion needed. Pt states she has gotten the flu shot in October. Pt would like labs today    Patient would like communication of their results via:        Cell Phone:   Telephone Information:   Mobile [phone]     [place] Neighbor to leave a message containing results?  Yes Yes

## 2020-11-05 LAB — HCG UR QL: NEGATIVE — SIGNIFICANT CHANGE UP

## 2020-11-05 PROCEDURE — 99232 SBSQ HOSP IP/OBS MODERATE 35: CPT | Mod: GC

## 2020-11-05 RX ORDER — HALOPERIDOL DECANOATE 100 MG/ML
5 INJECTION INTRAMUSCULAR
Refills: 0 | Status: DISCONTINUED | OUTPATIENT
Start: 2020-11-05 | End: 2020-11-10

## 2020-11-05 RX ADMIN — RISPERIDONE 2 MILLIGRAM(S): 4 TABLET ORAL at 20:16

## 2020-11-05 RX ADMIN — RISPERIDONE 2 MILLIGRAM(S): 4 TABLET ORAL at 11:20

## 2020-11-05 RX ADMIN — Medication 1 APPLICATION(S): at 20:16

## 2020-11-05 NOTE — PROGRESS NOTE BEHAVIORAL HEALTH - NSBHCHARTREVIEWVS_PSY_A_CORE FT
Vital Signs Last 24 Hrs  T(C): 36.7 (05 Nov 2020 08:02), Max: 36.7 (05 Nov 2020 08:01)  T(F): 98.1 (05 Nov 2020 08:02), Max: 98.1 (05 Nov 2020 08:01)  HR: 80 (05 Nov 2020 08:02) (77 - 80)  BP: 96/73 (05 Nov 2020 08:02) (86/55 - 96/73)  BP(mean): --  RR: 16 (05 Nov 2020 08:02) (16 - 16)  SpO2: --

## 2020-11-05 NOTE — CHART NOTE - NSCHARTNOTEFT_GEN_A_CORE
Social Work Note:    Treatment team met with patient earlier today on unit rounds.  At time of assessment patient would only discuss her discharge today from the hospital.  Attempts to redirect patient were not successful. She questioned the treatment team as to the status of an appeal she wished to have for the treatment over objection hearing that occurred on Monday 11/02.  She was directed to call [facility] .  After interview concluded patient followed treatment team out of her room in the hallway where she was loud and disruptive.  Later patient was heard aggressively banging on the door to the social work office and then the office of an attending psychiatrist.      Treatment team meeting occurred with patient earlier this week.  At that time treatment goal of making reality based statements discussed.  Patient advised she has met this goal despite being disorganized and not fully cooperative with discussion of her goals.       Patient does not provide any family or other social supports for contact.  She informed she has a phone but that it is broken.  [employer] was offered as a resource for patient to apply for a phone.  She is not interested in calling as she informed she has money to purchase a phone on her own.     On 11/03 Department of Homeless Services was contacted.  A [facility] ID # [id number] was found for the name of Berry not Amadou.      Yesterday patient was agitated and disruptive requiring IM medication and seclusion.      This worker was in contact with  of [facility] Kiesha Durham [phone].  Kiesha did not have any information on any family or other social supports for patient.  She also did not have any community address – shelter or private residence that matched what patient provided.  Kiesha will be updated regarding patient’s status for collaboration on discharge plan.      Mental Status Exam:    Mood – Irritable   Sleep – Good   Appetite – Good   ADLs – Fair    Thought Process – Illogical   Observation – [id number]    No barriers to discharge identified at this time.     At this time patient is not psychiatrically stable for discharge.

## 2020-11-05 NOTE — PROGRESS NOTE BEHAVIORAL HEALTH - SUMMARY
43yo F with unclear social hx (domicile, employment, marital status) with past documented hx of schzioaffective d/o, cannabis use w/ most recent psych hospitalization in Aug 2020 in [facility]. In ED Patient presented with reported disorganized behavior, persecutory and paranoid statements. Patient had also been noted to be aggressive and agitated requiring multiple prns inc haldol 10mg total, ativan 2mg and benadryl 50mg.     Evaluation today found patient to be irritable, continues to be easily agitated, irritable, and paranoid.   Given her current presentation and past history, she is currently unsafe from discharge and should remain in IPP.     # Schizoaffective d/o  - Continue risperidone 2mg po BID  - Court held for TOO on 11/2/20  - patient received sustenna invega 234mg x1 11/4 (pt can't refuse due to TOO)  - patient to receive sustenna invega 156mg x1 at a later date    # Acute agitation  For acute agitation not responsive to verbal redirection may give patient Haldol 5mg po q6, Ativan 2mg po q6 and benadryl 50 po q6 prn for agitation with escalation to IM if patient becomes a danger to him/herself/others or property. 45yo F with unclear social hx (domicile, employment, marital status) with past documented hx of schzioaffective d/o, cannabis use w/ most recent psych hospitalization in Aug 2020 in [facility]. In ED Patient presented with reported disorganized behavior, persecutory and paranoid statements. Patient had also been noted to be aggressive and agitated requiring multiple prns inc haldol 10mg total, ativan 2mg and benadryl 50mg.     Evaluation today found patient to be irritable, continues to be easily agitated, irritable, and paranoid.   Given her current presentation and past history, she is currently unsafe from discharge and should remain in IPP.     # Schizoaffective d/o  - Court held for TOO on 11/2/20, TOO granted  - Continue risperidone 2mg po BID, alternatively will require IM medication if patient refuses PO medications.   - patient received sustenna invega 234mg x1 11/4 (pt can't refuse due to TOO)  - patient to receive sustenna invega 156mg x1 on 11/11    # Acute agitation  For acute agitation not responsive to verbal redirection may give patient Haldol 5mg po q6, Ativan 2mg po q6 and benadryl 50 po q6 prn for agitation with escalation to IM if patient becomes a danger to him/herself/others or property.
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
## 2020-11-06 PROCEDURE — 99231 SBSQ HOSP IP/OBS SF/LOW 25: CPT | Mod: GC

## 2020-11-06 RX ORDER — LIDOCAINE 4 G/100G
1 CREAM TOPICAL ONCE
Refills: 0 | Status: COMPLETED | OUTPATIENT
Start: 2020-11-06 | End: 2020-11-06

## 2020-11-06 RX ADMIN — Medication 1 APPLICATION(S): at 20:37

## 2020-11-06 RX ADMIN — RISPERIDONE 2 MILLIGRAM(S): 4 TABLET ORAL at 20:30

## 2020-11-06 RX ADMIN — LIDOCAINE 1 PATCH: 4 CREAM TOPICAL at 20:30

## 2020-11-06 RX ADMIN — RISPERIDONE 2 MILLIGRAM(S): 4 TABLET ORAL at 09:28

## 2020-11-06 NOTE — PROGRESS NOTE BEHAVIORAL HEALTH - NSBHCHARTREVIEWLAB_PSY_A_CORE FT
A1C with Estimated Average Glucose in AM (10.28.20 @ 08:20)    A1C with Estimated Average Glucose Result: 5.8: Method: Immunoassay       Reference Range                4.0-5.6%       High risk (prediabetic)        5.7-6.4%       Diabetic, diagnostic             >=6.5%       ADA diabetic treatment goal       <7.0%  The Hemoglobin A1c testing is NGSP-certified.Reference ranges are based  upon the 2010 recommendations of  the American Diabetes Association.  Interpretation may vary for children  and adolescents. %    Estimated Average Glucose: 120: The Estimated Average Glucose (eAG) or Mean Plasma Glucose (MPG) value is  calculated from the hemoglobin A1c value and covers the same time period.   The American Diabetes Association (ADA) and other professional  organizations recommend reporting the eAG with the HgbA1c. mg/dL
Pregnancy Profile, Urine: Negative
15.7   4.13  )-----------( 261      ( 28 Oct 2020 08:20 )             46.7   10-28    139  |  103  |  16  ----------------------------<  103<H>  4.3   |  29  |  0.8    Ca    9.7      28 Oct 2020 08:20    TPro  7.2  /  Alb  4.3  /  TBili  0.3  /  DBili  x   /  AST  38  /  ALT  42<H>  /  AlkPhos  60  10-28    A1C with Estimated Average Glucose Result: 5.8  Estimated Average Glucose: 120    Lipid Profile in AM (10.28.20 @ 08:20)    Cholesterol, Serum: 223 mg/dL    Triglycerides, Serum: 95 mg/dL    HDL Cholesterol, Serum: 92 mg/dL    Non HDL Cholesterol: 131    LDL Cholesterol Calculated: 119 mg/dL
Basic Metabolic Panel (10.24.20 @ 05:19)    Sodium, Serum: 141 mmol/L    Potassium, Serum: 5.2 mmol/L    Chloride, Serum: 111 mmol/L    Carbon Dioxide, Serum: 28 mmol/L    Anion Gap, Serum: 2 mmol/L    Blood Urea Nitrogen, Serum: 18 mg/dL    Creatinine, Serum: 0.97 mg/dL    Glucose, Serum: 99 mg/dL    Calcium, Total Serum: 8.8 mg/dL    eGFR if Non : 70: Interpretative comment    Complete Blood Count + Automated Diff (10.24.20 @ 05:19)    WBC Count: 5.91 K/uL    RBC Count: 4.19 M/uL    Hemoglobin: 13.5 g/dL    Hematocrit: 40.0 %    Mean Cell Volume: 95.5 fl    Mean Cell Hemoglobin: 32.2 pg    Mean Cell Hemoglobin Conc: 33.8 gm/dL    Red Cell Distrib Width: 13.2 %    Platelet Count - Automated: 249 K/uL    Auto Neutrophil #: 3.00 K/uL    Auto Lymphocyte #: 1.82 K/uL    Auto Monocyte #: 0.75 K/uL    Auto Eosinophil #: 0.28 K/uL    Auto Basophil #: 0.04 K/uL    Auto Neutrophil %: 50.8: Differential percentages must be correlated with absolute numbers for  clinical significance. %    Auto Lymphocyte %: 30.8 %    Auto Monocyte %: 12.7 %    Auto Eosinophil %: 4.7 %    Auto Basophil %: 0.7 %    Auto Immature Granulocyte %: 0.3 %    Nucleated RBC: 0 /100 WBCs

## 2020-11-06 NOTE — PROGRESS NOTE BEHAVIORAL HEALTH - SUMMARY
43yo F with unclear social hx (domicile, employment, marital status) with past documented hx of schzioaffective d/o, cannabis use w/ most recent psych hospitalization in Aug 2020 in [facility]. In ED Patient presented with reported disorganized behavior, persecutory and paranoid statements. Patient had also been noted to be aggressive and agitated requiring multiple prns inc haldol 10mg total, ativan 2mg and benadryl 50mg.     Evaluation today found patient to be irritable, continues to be easily agitated, irritable, and paranoid.   Given her current presentation and past history, she is currently unsafe from discharge and should remain in IPP.     # Schizoaffective d/o  - Court held for TOO on 11/2/20, TOO granted  - Continue risperidone 2mg po BID, alternatively will require IM medication if patient refuses PO medications.   - patient received sustenna invega 234mg x1 11/4 (pt can't refuse due to TOO)  - patient to receive sustenna invega 156mg x1 on 11/11    # Acute agitation  For acute agitation not responsive to verbal redirection may give patient Haldol 5mg po q6, Ativan 2mg po q6 and benadryl 50 po q6 prn for agitation with escalation to IM if patient becomes a danger to him/herself/others or property.

## 2020-11-06 NOTE — PROGRESS NOTE BEHAVIORAL HEALTH - NSBHCHARTREVIEWVS_PSY_A_CORE FT
Vital Signs Last 24 Hrs  T(C): 36.3 (06 Nov 2020 08:27), Max: 37.2 (05 Nov 2020 15:50)  T(F): 97.3 (06 Nov 2020 08:27), Max: 99 (05 Nov 2020 15:50)  HR: 101 (06 Nov 2020 08:27) (89 - 101)  BP: 106/61 (06 Nov 2020 08:27) (103/46 - 106/61)  BP(mean): --  RR: 16 (06 Nov 2020 08:27) (16 - 16)  SpO2: --

## 2020-11-06 NOTE — PROGRESS NOTE BEHAVIORAL HEALTH - NSBHFUPINTERVALHXFT_PSY_A_CORE
Patient seen and evaluated at bedside. Per nursing staff, patient was agreeable to take po meds, had labs drawn and has been with improved behavioral control.     On encounter, patient was refusing to interact with team, only responding with "good". She once more reported that she wanted to leave but was informed she could not be released without improvement. She quickly became irritable, tangential and avoiding questions by attempting to turn the interview on team.   patient was unable to be redirected and therefore interview was terminated. Patient continued to ramble for several minutes after team left her presence.

## 2020-11-07 RX ADMIN — RISPERIDONE 2 MILLIGRAM(S): 4 TABLET ORAL at 08:04

## 2020-11-07 RX ADMIN — LIDOCAINE 1 PATCH: 4 CREAM TOPICAL at 07:36

## 2020-11-07 RX ADMIN — Medication 1 APPLICATION(S): at 20:03

## 2020-11-07 RX ADMIN — RISPERIDONE 2 MILLIGRAM(S): 4 TABLET ORAL at 20:03

## 2020-11-08 RX ORDER — LIDOCAINE 4 G/100G
1 CREAM TOPICAL DAILY
Refills: 0 | Status: DISCONTINUED | OUTPATIENT
Start: 2020-11-08 | End: 2020-11-10

## 2020-11-08 RX ORDER — LANOLIN ALCOHOL/MO/W.PET/CERES
5 CREAM (GRAM) TOPICAL AT BEDTIME
Refills: 0 | Status: DISCONTINUED | OUTPATIENT
Start: 2020-11-08 | End: 2020-11-10

## 2020-11-08 RX ADMIN — LIDOCAINE 1 PATCH: 4 CREAM TOPICAL at 20:21

## 2020-11-08 RX ADMIN — Medication 1 APPLICATION(S): at 20:22

## 2020-11-08 RX ADMIN — RISPERIDONE 2 MILLIGRAM(S): 4 TABLET ORAL at 08:41

## 2020-11-08 RX ADMIN — RISPERIDONE 2 MILLIGRAM(S): 4 TABLET ORAL at 20:21

## 2020-11-08 RX ADMIN — Medication 5 MILLIGRAM(S): at 20:44

## 2020-11-09 PROCEDURE — 99231 SBSQ HOSP IP/OBS SF/LOW 25: CPT

## 2020-11-09 RX ORDER — PALIPERIDONE 1.5 MG/1
156 TABLET, EXTENDED RELEASE ORAL ONCE
Refills: 0 | Status: COMPLETED | OUTPATIENT
Start: 2020-11-09 | End: 2020-11-09

## 2020-11-09 RX ADMIN — LIDOCAINE 1 PATCH: 4 CREAM TOPICAL at 19:15

## 2020-11-09 RX ADMIN — LIDOCAINE 1 PATCH: 4 CREAM TOPICAL at 19:07

## 2020-11-09 RX ADMIN — Medication 5 MILLIGRAM(S): at 21:14

## 2020-11-09 RX ADMIN — LIDOCAINE 1 PATCH: 4 CREAM TOPICAL at 08:28

## 2020-11-09 RX ADMIN — PALIPERIDONE 156 MILLIGRAM(S): 1.5 TABLET, EXTENDED RELEASE ORAL at 15:20

## 2020-11-09 RX ADMIN — RISPERIDONE 2 MILLIGRAM(S): 4 TABLET ORAL at 08:24

## 2020-11-09 RX ADMIN — LIDOCAINE 1 PATCH: 4 CREAM TOPICAL at 08:24

## 2020-11-09 RX ADMIN — LIDOCAINE 1 PATCH: 4 CREAM TOPICAL at 08:27

## 2020-11-09 RX ADMIN — Medication 1 APPLICATION(S): at 21:17

## 2020-11-09 NOTE — DISCHARGE NOTE BEHAVIORAL HEALTH - NSBHDCTHERAPYFT_PSY_A_CORE
Patient was engaged in group, milieu, and supportive therapies while on the unit.  Treatment team was conducted weekly with the participating of nurse, , and providers.  Family was encouraged to speak on the phone to the patient to help with safety planning. Patient was engaged in group, milieu, and supportive therapies while on the unit. Treatment team was conducted weekly with the participating of nurse, , and providers.  Family was encouraged to speak on the phone to the patient to help with safety planning.

## 2020-11-09 NOTE — CHART NOTE - NSCHARTNOTEFT_GEN_A_CORE
Social Work Note:    Patient was assessed by treatment team earlier today.  At that time patient was alert, oriented, and able to engage with treatment team.  Suicidal / homicidal ideation denied.  Audio / visual hallucinations denied.  Patient has been in better behavioral control.      Discharge is anticipated for tomorrow - Tuesday November 10.

## 2020-11-09 NOTE — PROGRESS NOTE BEHAVIORAL HEALTH - MODIFICATIONS
seen/discussed with resident.  Less verbally abusive and psychotic today. Complying with meds. Will continue tx plan
None
As above
seen/discussed with resident. Pt was irritable and psychotic earlier in the day, but has since remained calm and cooperative with labs and PO meds. Will continue to monitor
See below.
None
None

## 2020-11-09 NOTE — PROGRESS NOTE BEHAVIORAL HEALTH - NSBHADMITDANGERSELF_PSY_A_CORE
unable to care for self
unable to care for self, threatening to others
unable to care for self
unable to care for self

## 2020-11-09 NOTE — PROGRESS NOTE BEHAVIORAL HEALTH - SUMMARY
45yo F with unclear social hx (domicile, employment, marital status) with past documented hx of schzioaffective d/o, cannabis use w/ most recent psych hospitalization in Aug 2020 in [facility]. In ED Patient presented with reported disorganized behavior, persecutory and paranoid statements. Patient had also been noted to be aggressive and agitated requiring multiple prns inc haldol 10mg total, ativan 2mg and benadryl 50mg.     Evaluation today found patient to be irritable but redirectable and cooperative with team. Nursing notes improved behavioral control. Patient has not demonstrated sufficient capacity to care for self and therefore required continued IPP.     # Schizoaffective d/o  - Court held for TOO on 11/2/20, TOO granted  - Continue risperidone 2mg po BID, alternatively will require IM medication if patient refuses PO medications.   - patient received sustenna invega 234mg x1 11/4 (pt can't refuse due to TOO)  - patient received sustenna invega 156mg x1 on 11/9    # Acute agitation  For acute agitation not responsive to verbal redirection may give patient Haldol 5mg po q6, Ativan 2mg po q6 and benadryl 50 po q6 prn for agitation with escalation to IM if patient becomes a danger to him/herself/others or property. 45yo F with unclear social hx (domicile, employment, marital status) with past documented hx of schzioaffective d/o, cannabis use w/ most recent psych hospitalization in Aug 2020 in [facility]. In ED Patient presented with reported disorganized behavior, persecutory and paranoid statements. Patient had also been noted to be aggressive and agitated requiring multiple prns inc haldol 10mg total, ativan 2mg and benadryl 50mg.     Evaluation today found patient to be irritable but redirectable and cooperative with team. Nursing notes improved behavioral control. Patient has not demonstrated sufficient capacity to care for self and therefore required continued IPP.     # Schizoaffective d/o  - Court held for TOO on 11/2/20, TOO granted  - D/c risperidone 2mg po BID, as pt has been transitioned to Invega Sustenna  - patient received sustenna invega 234mg x1 11/4 (pt can't refuse due to TOO)  - patient received sustenna invega 156mg x1 on 11/9; continue [id number].    # Acute agitation  For acute agitation not responsive to verbal redirection may give patient Haldol 5mg po q6, Ativan 2mg po q6 and benadryl 50 po q6 prn for agitation with escalation to IM if patient becomes a danger to him/herself/others or property.

## 2020-11-09 NOTE — DISCHARGE NOTE BEHAVIORAL HEALTH - MEDICATION SUMMARY - MEDICATIONS TO TAKE
I will START or STAY ON the medications listed below when I get home from the hospital:    Invega Sustenna 156 mg/mL intramuscular suspension, extended release  -- 1 dose(s) intramuscular every 4 weeks. continue to take until directed otherwise. Invega sustenna 234 mg given on 11/4/2020. 156 mg given on 11/9/2020. Next dose due 12/9/2020  -- Indication: For Schizoaffective disorder    melatonin 3 mg oral tablet  -- 1 tab(s) by mouth once a day (at bedtime) Continue to take until directed otherwise  -- Indication: For insomnia
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
## 2020-11-09 NOTE — DISCHARGE NOTE BEHAVIORAL HEALTH - NSBHDCRESPONSEFT_PSY_A_CORE
Pt has made significant progress over the course of hospitalization. With continuous psychotherapy from the treatment team and the medications, patient was found to be with decreased Patient was initially found to be irritable, not redirectable, with aggressive behavior and paranoia, concerning for lashawn and psychosis. Patient was started on risperidone 2mg po BID. Patient was found to be non-compliant for multiple days and court ordered TOO was approved. Patient became agreeable to take risperidone 2mg po bid. due to non-compliance, patient was additionally administered sustenna invega 234mg IM deltoid x1, followed by sustenna invega 156mg IM deltoid x1 one week later. With prescribed medications, patient was found to have significant clinical improvement. Patient was found to be significantly less irritable and agitated, initially requiring IM medications for management and later becoming redirectable. Patient became less fixated on her court filings as time progressed and was able to engage with others in more appropriate ways, such as joining the day room to watch tv. patient reports feeling better, sleeping and eating well. Thought process and insight improved. Pt was calm and cooperative and was in good behavioral control. Patient denied any suicidal or homicidal ideations. Patient denied any auditory or visual hallucinations. Pt was evaluated by treatment team, pt is stable for discharge and patient shows no imminent danger to self, others or property at this time. Pt understands and agrees with treatment plan and following up with outpatient. Psychoeducation provided regarding diagnosis, medications, treatment and follow up. Risks, benefits, alternatives discussed, all questions and concerns addressed and answered.

## 2020-11-09 NOTE — PROGRESS NOTE BEHAVIORAL HEALTH - NSBHADMITIPOBSFT_PSY_A_CORE
safety
(on 1:1 while in seclusion)
unit protocol

## 2020-11-09 NOTE — PROGRESS NOTE BEHAVIORAL HEALTH - NSBHADMITIPREASON_PSY_A_CORE
Danger to self; mental illness expected to respond to inpatient care
Danger to self; mental illness expected to respond to inpatient care/danger to self and others
Danger to self; mental illness expected to respond to inpatient care
Danger to self; mental illness expected to respond to inpatient care

## 2020-11-09 NOTE — DISCHARGE NOTE BEHAVIORAL HEALTH - NSBHDCSIGEVENTSFT_PSY_A_CORE
Pt initially was refusing medications. Court was held for TOO; which was obtained. Pt also petitioned to be released from the hospital, however the  found that pt required continued hospitalization and TOO.

## 2020-11-09 NOTE — DISCHARGE NOTE BEHAVIORAL HEALTH - PAST PSYCHIATRIC HISTORY
unclear: eported hx of schizoaffective d/o, cannabis use with no reported/documented hx of SA/SIB, with hx of psych hospitalizations (most recent in [facility] from 8/13 to 9/2). As per chart review, pt was discharged on Risperdal 4 mg qhs.   Per chart review: past medication trials: Zyprexa 10mg daily, Remeron 30mg daily, Risperdal 3mg daily, Nicotine Polacrilex 2mg 5.5 daily, Invega Sustenna 234mg IM, Lamictal 25mg BID    Per chart review: Pt used to follow up at the [facility] from 11/30/2017- 2/6/2018; her case was also closed at [facility] due to no show appts    Per Psyckes: 1 Psych ER visit at [facility] last 7/16/2017

## 2020-11-09 NOTE — PROGRESS NOTE BEHAVIORAL HEALTH - NSBHATTESTSEENBY_PSY_A_CORE
attending Psychiatrist without NP/Trainee
Attending Psychiatrist supervising NP/Trainee, meeting pt...

## 2020-11-09 NOTE — PROGRESS NOTE BEHAVIORAL HEALTH - INSIGHT (REGARDING PSYCHIATRIC ILLNESS)
Patient requests all Lab and Radiology Results on their Discharge Instructions
Poor
Fair
Poor
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
## 2020-11-09 NOTE — PROGRESS NOTE BEHAVIORAL HEALTH - NSBHCHARTREVIEWVS_PSY_A_CORE FT
Vital Signs Last 24 Hrs  T(C): 35.6 (09 Nov 2020 15:51), Max: 36.1 (09 Nov 2020 08:19)  T(F): 96 (09 Nov 2020 15:51), Max: 96.9 (09 Nov 2020 08:19)  HR: 80 (09 Nov 2020 15:51) (77 - 102)  BP: 117/68 (09 Nov 2020 15:51) (104/59 - 117/68)  BP(mean): --  RR: 18 (09 Nov 2020 15:51) (18 - 18)  SpO2: --

## 2020-11-09 NOTE — PROGRESS NOTE BEHAVIORAL HEALTH - RISK ASSESSMENT
Patient low risk for suicidality  Risk factors of past psych hx and impulsivity are mitigated by protective factors of future oriented, expressive family as a motivation factor and denial of current suicidal ideations.

## 2020-11-09 NOTE — DISCHARGE NOTE BEHAVIORAL HEALTH - NSBHDCSWCOMMENTSFT_PSY_A_CORE
Discharge Summary to [facility] [phone] Discharge Summary Faxed to [facility] [phone] on 11/10/2020 at 10:25am.

## 2020-11-09 NOTE — PROGRESS NOTE BEHAVIORAL HEALTH - LANGUAGE
Other
No abnormalities noted

## 2020-11-09 NOTE — DISCHARGE NOTE BEHAVIORAL HEALTH - NSBHDCMEDSFT_PSY_A_CORE
Patient was initially found to be irritable, not redirectable, with aggressive behavior and paranoia, concerning for lashawn and psychosis. Patient was started on risperidone 2mg po BID. Patient was found to be non-compliant for multiple days and court ordered TOO was approved. Patient became agreeable to take risperidone 2mg po bid. due to non-compliance, patient was additionally administered sustenna invega 234mg IM deltoid x1, followed by sustenna invega 156mg IM deltoid x1 one week later. With prescribed medications, patient was found to have significant clinical improvement. Patient was found to be significantly less irritable and agitated, initially requiring IM medications for management and later becoming redirectable. Patient became less fixated on her court filings as time progressed and was able to engage with others in more appropriate ways, such as joining the day room to watch tv. Patient was started on risperidone 2mg po BID. Patient was found to be non-compliant for multiple days and court ordered TOO was approved. Patient became agreeable to take risperidone 2mg po bid. due to non-compliance, patient was additionally administered sustenna invega 234mg IM deltoid x1 on 11/4/20, followed by sustenna invega 156mg IM deltoid x1 on 11/9/20.

## 2020-11-09 NOTE — PROGRESS NOTE BEHAVIORAL HEALTH - NS ED BHA REVIEW OF ED CHART AVAILABLE INVESTIGATIONS REVIEWED
None available
Yes
None available
None available

## 2020-11-09 NOTE — PROGRESS NOTE BEHAVIORAL HEALTH - NSBHFUPINTERVALHXFT_PSY_A_CORE
Patient seen and evaluated at bedside. Per nursing staff, patient has been with good behavioral control over the weekend.     Patient was seen and evaluated at bedside. Patient reports that he has been well over the weekend. Patient reports that has not experienced any suicidal or homicidal thoughts or ideations. She denied any auditory or visual hallucinations. Patient was agreeable to receive second invega sustenna inject today. Patient seen and evaluated at bedside. Per nursing staff, patient has been with good behavioral control over the weekend.     Patient was seen and evaluated at bedside. Patient reports that he has been well over the weekend. Patient reports that has not experienced any suicidal or homicidal thoughts or ideations. She denied any auditory or visual hallucinations. Patient was agreeable to receive second invega sustenna inject today. Pt denied experiencing any side effects from the medications. Endorsed good sleep and appetite. Remains appropriate and visible on the unit.
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
## 2020-11-10 VITALS
RESPIRATION RATE: 18 BRPM | DIASTOLIC BLOOD PRESSURE: 61 MMHG | SYSTOLIC BLOOD PRESSURE: 101 MMHG | HEART RATE: 93 BPM | TEMPERATURE: 96 F

## 2020-11-10 PROCEDURE — 99238 HOSP IP/OBS DSCHRG MGMT 30/<: CPT

## 2020-11-10 RX ORDER — LANOLIN ALCOHOL/MO/W.PET/CERES
1 CREAM (GRAM) TOPICAL
Qty: 0 | Refills: 0 | DISCHARGE
Start: 2020-11-10

## 2020-11-10 RX ORDER — PALIPERIDONE 1.5 MG/1
1 TABLET, EXTENDED RELEASE ORAL
Qty: 0 | Refills: 0 | DISCHARGE

## 2020-11-10 NOTE — CHART NOTE - NSCHARTNOTEFT_GEN_A_CORE
Pt was seen, evaluated, chart reviewed. As per nursing staff, no events overnight. On evaluation, pt reports that she is doing well. Tolerated the Invega Sustenna injection yesterday without side effects. Is compliant with medication, denies negative side effects. Endorsed good sleep and appetite. Denied A/V hallucinations. Denied paranoia. Denied suicidal/homicidal ideation, intent or plan.    Pt is for discharge today.

## 2020-11-10 NOTE — CHART NOTE - NSCHARTNOTEFT_GEN_A_CORE
Social Work Discharge Note:    Patient is for discharge today.   She is alert and oriented x3.  Mood is improved.  Anxiety has decreased.  Insight and judgment have improved.  Suicidal / homicidal ideation denied.      Patient will be discharged to [address] [place] [address] [phone].  Plan is for referral to [facility] outpatient mental health clinic.  Patient is aware and agreeable to same.      [facility] [phone] provided as an additional resource.     Patient is aware and agreeable to discharge today.

## 2020-11-13 DIAGNOSIS — F12.10 CANNABIS ABUSE, UNCOMPLICATED: ICD-10-CM

## 2020-11-13 DIAGNOSIS — Z91.018 ALLERGY TO OTHER FOODS: ICD-10-CM

## 2020-11-13 DIAGNOSIS — R00.0 TACHYCARDIA, UNSPECIFIED: ICD-10-CM

## 2020-11-13 DIAGNOSIS — R45.1 RESTLESSNESS AND AGITATION: ICD-10-CM

## 2020-11-13 DIAGNOSIS — F25.9 SCHIZOAFFECTIVE DISORDER, UNSPECIFIED: ICD-10-CM

## 2020-11-13 DIAGNOSIS — Z91.013 ALLERGY TO SEAFOOD: ICD-10-CM

## 2020-11-13 DIAGNOSIS — Z79.899 OTHER LONG TERM (CURRENT) DRUG THERAPY: ICD-10-CM

## 2020-11-13 DIAGNOSIS — Z28.21 IMMUNIZATION NOT CARRIED OUT BECAUSE OF PATIENT REFUSAL: ICD-10-CM

## 2020-11-13 DIAGNOSIS — Z88.6 ALLERGY STATUS TO ANALGESIC AGENT: ICD-10-CM
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
## 2022-01-27 NOTE — ED BEHAVIORAL HEALTH ASSESSMENT NOTE - NS ED BHA HOMICIDALITY PRESENT AGGRESSION PROPERTY PAST MONTH
"SUBJECTIVE:   ALEXA Luu is a 87 year old female who presents for Preventive Visit.  Patient has been advised of split billing requirements and indicates understanding: Yes  Are you in the first 12 months of your Medicare coverage?  No    Healthy Habits:     In general, how would you rate your overall health?  Fair    Frequency of exercise:  2-3 days/week    Duration of exercise:  15-30 minutes    Do you usually eat at least 4 servings of fruit and vegetables a day, include whole grains    & fiber and avoid regularly eating high fat or \"junk\" foods?  Yes    Taking medications regularly:  Yes    Ability to successfully perform activities of daily living:  Transportation requires assistance, preparing meals requires assistance, housework requires assistance, laundry requires assistance and money management requires assistance    Home Safety:  No safety concerns identified    Hearing Impairment:  Difficulty following a conversation in a noisy restaurant or crowded room, feel that people are mumbling or not speaking clearly, difficulty following dialogue in the theater, difficult to understand a speaker at a public meeting or Restorationist service, need to ask people to speak up or repeat themselves, find that men's voices are easier to understand than woman's, difficulty understanding soft or whispered speech, difficulty understanding speech on the telephone and no hearing concerns    In the past 6 months, have you been bothered by leaking of urine? Yes    In general, how would you rate your overall mental or emotional health?  Good      PHQ-2 Total Score: 1    Additional concerns today:  Yes    Do you feel safe in your environment? Yes    Have you ever done Advance Care Planning? (For example, a Health Directive, POLST, or a discussion with a medical provider or your loved ones about your wishes): Yes, advance care planning is on file.      Fall risk  Fallen 2 or more times in the past year?: (P) Yes  Any fall " with injury in the past year?: (P) No  click delete button to remove this line now  Cognitive Screening   1) Repeat 3 items (Leader, Season, Table)    2) Clock draw: NORMAL  3) 3 item recall: Recalls 1 object   Results: NORMAL clock, 1-2 items recalled: COGNITIVE IMPAIRMENT LESS LIKELY    Mini-CogTM Copyright CAMI Stephens. Licensed by the author for use in [facility]; reprinted with permission (sojudith@[facility]). All rights reserved.        Reviewed and updated as needed this visit by clinical staff  Tobacco  Allergies  Meds  Problems  Med Hx  Surg Hx  Fam Hx  Soc Hx         Reviewed and updated as needed this visit by Provider  Tobacco  Allergies  Meds  Problems  Med Hx  Surg Hx  Fam Hx  Soc Hx        Social History     Tobacco Use     Smoking status: Never Smoker     Smokeless tobacco: Never Used   Substance Use Topics     Alcohol use: No       Alcohol Use 1/27/2022   Prescreen: >3 drinks/day or >7 drinks/week? Not Applicable       Current providers sharing in care for this patient include:   Patient Care Team:  Juhi Lovell MD as PCP - Ed Jones MD as MD (Neurology)  Jack Duke MD as MD (Ophthalmology)  [facility] ([facility] ([place]  Juhi Lovell MD as Assigned PCP  Sean Cyr MD as Assigned Sleep Provider  Ed Mendoza MD as Assigned Neuroscience Provider  Amy Day MD as Assigned Surgical Provider    The following health maintenance items are reviewed in Epic and correct as of today:  There are no preventive care reminders to display for this patient.      Review of Systems   Endorses: double and blurry vision, hoarseness, constipation, joint pain, arthritis  Constitutional, HEENT, cardiovascular, pulmonary, GI, , musculoskeletal, neuro, skin, endocrine and psych systems are negative, except as otherwise noted.    OBJECTIVE:   BP (!) 146/82   Pulse 72    "Ht 1.651 m (5' 5\")   Wt 56.2 kg (124 lb)   SpO2 96%   BMI 20.63 kg/m   Estimated body mass index is 20.63 kg/m  as calculated from the following:    Height as of this encounter: 1.651 m (5' 5\").    Weight as of this encounter: 56.2 kg (124 lb).  Physical Exam  Gen: Alert, NAD, appears stated age, normal hygiene   Eyes: conjunctivae without injection, sclera clear, EOMI  ENT/mouth: nares clear, septum midline, absent rhinorrhea, throat without injection, neck is supple, no thyroid enlargement  CV: RRR, no murmur appreciated, pedal edema absent bilaterally  Resp: CTAB, no wheezes, rales or ronchi  ABD: normoactive, non-tender to palpation, nondistended  MSK: grossly full range of motion in all joints, no obvious deformity; presents in wheelchair  Neuro: CN II-XII grossly intact, no deficits in coordination  Psych: no apparent hallucinations or delusions, no pressured speech; alert, oriented x3  SKIN: dry and without lesions  Heme/lymph: no pallor, no active bleeding/bruising, no adenopathy appreciated      ASSESSMENT / PLAN:   USPSTF Recommendations for age 87:  Patient has been counseled on/screened for:  - intimate partner violence and there are no concerns at this time  - a healthful diet and physical activity for CVD prevention  - Diabetes SCREENING performed  Colorectal Cancer: done with screening  Immunizations: up to date except for covid and influenza which was recommended  Cervical Cancer Screening: done with screening  Mammogram: ordered today  Bone Density: Dexa scan ordered today  Fall risk assessment: pt cannot complete the test, very large fall risk    1. Atypical parkinsonism (H)  2. 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 Acute lacunar stroke (H)  4. Cryptogenic stroke (H)  High fall risk, managed by neurology. Presents in wheelchair today but with good limb control and no obvious tremors.     5. Health care maintenance  - *MA Screening Digital Bilateral; " "Future  - Comprehensive metabolic panel (BMP + Alb, Alk Phos, ALT, AST, Total. Bili, TP)  - CBC with platelets    6. Visit for screening mammogram  - *MA Screening Digital Bilateral; Future    7. Screening for osteoporosis  8. Asymptomatic menopause  - DX Hip/Pelvis/Spine; Future    9. Atrial fibrillation, unspecified type (H)  10. Non-ischemic cardiomyopathy (H)  Followed by cardiology    11. Chronic left hip pain  Strongly encourage patient to stop all ibuprofen use.  She can use this sporadically going forward but she needs to first stop completely.  Recommended use of Voltaren gel, increased use of gabapentin, and can consider alternatives going forward.    12. Keratoconjunctivitis sicca of both eyes (H)  Managed by ophthalmology    13. Vitamin D deficiency  - Vitamin D Deficiency      Patient has been advised of split billing requirements and indicates understanding: Yes    COUNSELING:  Reviewed preventive health counseling, as reflected in patient instructions    Estimated body mass index is 20.63 kg/m  as calculated from the following:    Height as of this encounter: 1.651 m (5' 5\").    Weight as of this encounter: 56.2 kg (124 lb).      She reports that she has never smoked. She has never used smokeless tobacco.      Appropriate preventive services were discussed with this patient, including applicable screening as appropriate for cardiovascular disease, diabetes, osteopenia/osteoporosis, and glaucoma.  As appropriate for age/gender, discussed screening for colorectal cancer, prostate cancer, breast cancer, and cervical cancer. Checklist reviewing preventive services available has been given to the patient.    Reviewed patients plan of care and provided an AVS. The Basic Care Plan (routine screening as documented in Health Maintenance) for ALEXA Morales meets the Care Plan requirement. This Care Plan has been established and reviewed with the Patient.    Counseling Resources:  ATP IV Guidelines  Pooled Cohorts " Equation Calculator  Breast Cancer Risk Calculator  Breast Cancer: Medication to Reduce Risk  FRAX Risk Assessment  ICSI Preventive Guidelines  Dietary Guidelines for Americans, 2010  [employer] MyPlate  ASA Prophylaxis  Lung CA Screening    Juhi Lovell MD  [facility]    Identified Health Risks:  Answers for HPI/ROS submitted by the patient on 1/27/2022  If you checked off any problems, how difficult have these problems made it for you to do your work, take care of things at home, or get along with other people?: Not difficult at all  PHQ9 TOTAL SCORE: 2        The patient was provided with suggestions to help her develop a healthy physical lifestyle.  The patient reports that she has difficulty with activities of daily living. I have asked that the patient make a follow up appointment in several weeks where this issue will be further evaluated and addressed.  The patient was provided with written information regarding signs of hearing loss.  Information on urinary incontinence and treatment options given to patient.  She is at risk for falling and has been provided with information to reduce the risk of falling at home.   Unable to assess

## 2023-04-17 NOTE — PROGRESS NOTE BEHAVIORAL HEALTH - JUDGMENT (REGARDING EVERYDAY EVENTS)
Pt reports being NPO for last 3 hours. Reports no alcohol consumption in last week. Pt denies any implants. Poor Fair
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
## 2023-09-26 NOTE — PATIENT PROFILE BEHAVIORAL HEALTH - NSBHRLGBLF_PSY_A_CORE
unsure Cimetidine Pregnancy And Lactation Text: This medication is Pregnancy Category B and is considered safe during pregnancy. It is also excreted in breast milk and breast feeding isn't recommended.

## 2024-03-01 NOTE — PROGRESS NOTE BEHAVIORAL HEALTH - ESTIMATED INTELLIGENCE
[facility] EMERGENCY DEPARTMENT  [address] [address]  Phone: [phone]    Cal Oliver was seen and treated in our emergency department on 3/1/2024.  He may return to work on 03/02/2024.         Thank you for choosing [facility].    Mihir Vazquez MD      
Other
Average

## 2024-04-29 NOTE — PROGRESS NOTE BEHAVIORAL HEALTH - NSBHFUPINTERVALHXFT_PSY_A_CORE
Refill passed per [facility] protocol.  Requested Prescriptions   Pending Prescriptions Disp Refills    amLODIPine 2.5 MG Oral Tab 90 tablet 1     Sig: Take 1 tablet (2.5 mg total) by mouth daily.       Hypertension Medications Protocol Passed - 4/26/2024  9:11 AM        Passed - CMP or BMP in past 12 months        Passed - Last BP reading less than 140/90     BP Readings from Last 1 Encounters:   01/08/24 110/85               Passed - In person appointment or virtual visit in the past 12 mos or appointment in next 3 mos     Recent Outpatient Visits              3 months ago Type 2 diabetes mellitus without complication, without long-term current use of insulin ([facility])    [facility] Dominik Edmond, DO    Office Visit    6 months ago Diabetic eye exam ([facility])    [facility] Dominik Edmond, DO    Office Visit    1 year ago Nasal sinus congestion    [facility] Dominik Edmond, DO    Office Visit    2 years ago Type 2 diabetes mellitus without complication, without long-term current use of insulin ([facility])    [facility] Dominik Edmond, DO    Office Visit    2 years ago Left wrist pain    [facility] [address] [place]    Office Visit          Future Appointments         Provider Department Appt Notes    In 3 weeks Dominik Edmond, DO [facility] sinus infection                    Passed - EGFRCR or GFRAA > 50     GFR Evaluation  EGFRCR: 83 , resulted on 10/6/2023             Recent Outpatient Visits              3 months ago Type 2 diabetes mellitus without complication, without long-term current use of insulin ([facility])    [facility], [facility] Dominik Edmond, DO    Office Visit    6 months ago Diabetic eye exam ([facility])    [facility] Dominik Edmond,     Office Visit    1 year ago Nasal sinus congestion    [facility] Dominik Edmond,     Office Visit    2 years ago Type 2 diabetes mellitus without complication, without long-term current use of insulin (HCC)    [facility] Dominik Edmond,     Office Visit    2 years ago Left wrist pain    [facility] - [address], Kiya Baron MD    Office Visit          Future Appointments         Provider Department Appt Notes    In 3 weeks Dominik Edmond,  [facility] sinus infection             Patient seen and evaluated during teams. Per nursing staff, patient was found to be agitated after court but was in better spirits today.     On evaluation patient found to be in good spirits and superficially cooperative. Patient reports that she is doing well but reported that her patch for back pain was only of minimal utility. She reports that she is no different today as compared to her previous days and reports that she would like to go home today. she expressed gratitude that team yesterday sent out email she had requested. She reports she is not suicidal or homicidal and does not want to hurt "shabbir, or edison or dr. bajwa or the student or the nurse". She denied auditory or visual hallucinations. During team, she gave team an address where she reports she was able to receive packages but was unverifiable with an internet search.    Later in day patient was informed that she would be required to get two shots of medications. she was found to be irritated that she would have to wait longer and reported that she would like a contract where she could get her first shot, be discharged and come back to get her additional injection.

## 2024-08-19 NOTE — PROGRESS NOTE BEHAVIORAL HEALTH - NSBHFUPTYPE_PSY_A_CORE
Patient was informed of negative findings on general exam.  Will await the results of the culture and a Pap smear and serology.  She will be informed results when they return.   Inpatient